# Patient Record
Sex: MALE | Race: WHITE | Employment: FULL TIME | URBAN - METROPOLITAN AREA
[De-identification: names, ages, dates, MRNs, and addresses within clinical notes are randomized per-mention and may not be internally consistent; named-entity substitution may affect disease eponyms.]

---

## 2020-05-12 ENCOUNTER — HOSPITAL ENCOUNTER (OUTPATIENT)
Dept: GENERAL RADIOLOGY | Age: 60
Discharge: HOME | End: 2020-05-12
Payer: COMMERCIAL

## 2020-05-12 ENCOUNTER — HOSPITAL ENCOUNTER (OUTPATIENT)
Dept: LAB | Age: 60
Discharge: HOME | End: 2020-05-12
Payer: COMMERCIAL

## 2020-05-12 ENCOUNTER — OFFICE VISIT (OUTPATIENT)
Dept: CARDIOLOGY CLINIC | Age: 60
End: 2020-05-12

## 2020-05-12 VITALS
BODY MASS INDEX: 30.52 KG/M2 | WEIGHT: 218 LBS | SYSTOLIC BLOOD PRESSURE: 110 MMHG | HEART RATE: 113 BPM | DIASTOLIC BLOOD PRESSURE: 72 MMHG | OXYGEN SATURATION: 97 % | HEIGHT: 71 IN

## 2020-05-12 DIAGNOSIS — R07.9 CHEST PAIN, UNSPECIFIED TYPE: ICD-10-CM

## 2020-05-12 DIAGNOSIS — R07.9 CHEST PAIN, UNSPECIFIED TYPE: Primary | ICD-10-CM

## 2020-05-12 LAB
ALBUMIN SERPL-MCNC: 4.4 G/DL (ref 3.4–5)
ALBUMIN/GLOB SERPL: 1.1 {RATIO} (ref 0.8–1.7)
ALP SERPL-CCNC: 78 U/L (ref 45–117)
ALT SERPL-CCNC: 38 U/L (ref 16–61)
ANION GAP SERPL CALC-SCNC: 9 MMOL/L (ref 3–18)
AST SERPL-CCNC: 24 U/L (ref 10–38)
BASOPHILS # BLD: 0 K/UL (ref 0–0.1)
BASOPHILS NFR BLD: 0 % (ref 0–2)
BILIRUB SERPL-MCNC: 0.7 MG/DL (ref 0.2–1)
BUN SERPL-MCNC: 15 MG/DL (ref 7–18)
BUN/CREAT SERPL: 13 (ref 12–20)
CALCIUM SERPL-MCNC: 8.8 MG/DL (ref 8.5–10.1)
CHLORIDE SERPL-SCNC: 103 MMOL/L (ref 100–111)
CO2 SERPL-SCNC: 27 MMOL/L (ref 21–32)
CREAT SERPL-MCNC: 1.19 MG/DL (ref 0.6–1.3)
DIFFERENTIAL METHOD BLD: ABNORMAL
EOSINOPHIL # BLD: 0 K/UL (ref 0–0.4)
EOSINOPHIL NFR BLD: 0 % (ref 0–5)
ERYTHROCYTE [DISTWIDTH] IN BLOOD BY AUTOMATED COUNT: 12.8 % (ref 11.6–14.5)
GLOBULIN SER CALC-MCNC: 3.9 G/DL (ref 2–4)
GLUCOSE SERPL-MCNC: 124 MG/DL (ref 74–99)
HCT VFR BLD AUTO: 46.6 % (ref 36–48)
HGB BLD-MCNC: 16.4 G/DL (ref 13–16)
INR PPP: 1 (ref 0.8–1.2)
LYMPHOCYTES # BLD: 2.6 K/UL (ref 0.9–3.6)
LYMPHOCYTES NFR BLD: 27 % (ref 21–52)
MCH RBC QN AUTO: 29.1 PG (ref 24–34)
MCHC RBC AUTO-ENTMCNC: 35.2 G/DL (ref 31–37)
MCV RBC AUTO: 82.8 FL (ref 74–97)
MONOCYTES # BLD: 0.8 K/UL (ref 0.05–1.2)
MONOCYTES NFR BLD: 9 % (ref 3–10)
NEUTS SEG # BLD: 6.1 K/UL (ref 1.8–8)
NEUTS SEG NFR BLD: 64 % (ref 40–73)
PLATELET # BLD AUTO: 259 K/UL (ref 135–420)
PMV BLD AUTO: 9.9 FL (ref 9.2–11.8)
POTASSIUM SERPL-SCNC: 4.5 MMOL/L (ref 3.5–5.5)
PROT SERPL-MCNC: 8.3 G/DL (ref 6.4–8.2)
PROTHROMBIN TIME: 13.1 SEC (ref 11.5–15.2)
RBC # BLD AUTO: 5.63 M/UL (ref 4.7–5.5)
SODIUM SERPL-SCNC: 139 MMOL/L (ref 136–145)
WBC # BLD AUTO: 9.5 K/UL (ref 4.6–13.2)

## 2020-05-12 PROCEDURE — 36415 COLL VENOUS BLD VENIPUNCTURE: CPT

## 2020-05-12 PROCEDURE — 80053 COMPREHEN METABOLIC PANEL: CPT

## 2020-05-12 PROCEDURE — 71046 X-RAY EXAM CHEST 2 VIEWS: CPT

## 2020-05-12 PROCEDURE — 85610 PROTHROMBIN TIME: CPT

## 2020-05-12 PROCEDURE — 85025 COMPLETE CBC W/AUTO DIFF WBC: CPT

## 2020-05-12 RX ORDER — DAPAGLIFLOZIN AND METFORMIN HYDROCHLORIDE 5; 1000 MG/1; MG/1
TABLET, FILM COATED, EXTENDED RELEASE ORAL
COMMUNITY

## 2020-05-12 RX ORDER — ASPIRIN 325 MG
325 TABLET ORAL DAILY
COMMUNITY
End: 2020-05-15

## 2020-05-12 RX ORDER — ROSUVASTATIN CALCIUM 20 MG/1
20 TABLET, COATED ORAL
COMMUNITY

## 2020-05-12 NOTE — PROGRESS NOTES
Chapincito Guadarrama presents today for No chief complaint on file. Chapincito Guadarrama preferred language for health care discussion is english/other. Is someone accompanying this pt? No      Is the patient using any DME equipment during OV? no    Depression Screening:  3 most recent PHQ Screens 5/12/2020   Little interest or pleasure in doing things Not at all   Feeling down, depressed, irritable, or hopeless Not at all   Total Score PHQ 2 0       Learning Assessment:  Learning Assessment 5/12/2020   PRIMARY LEARNER Patient   HIGHEST LEVEL OF EDUCATION - PRIMARY LEARNER  GRADUATED HIGH SCHOOL OR GED   BARRIERS PRIMARY LEARNER NONE   CO-LEARNER CAREGIVER No   CO-LEARNER NAME no   PRIMARY LANGUAGE ENGLISH   LEARNER PREFERENCE PRIMARY DEMONSTRATION   ANSWERED BY patient   RELATIONSHIP SELF       Abuse Screening:  Abuse Screening Questionnaire 5/12/2020   Do you ever feel afraid of your partner? N   Are you in a relationship with someone who physically or mentally threatens you? N   Is it safe for you to go home? Y       Fall Risk  Fall Risk Assessment, last 12 mths 5/12/2020   Able to walk? Yes   Fall in past 12 months? No       Pt currently taking Anticoagulant therapy?  mg once a day    Coordination of Care:  1. Have you been to the ER, urgent care clinic since your last visit? Hospitalized since your last visit? no    2. Have you seen or consulted any other health care providers outside of the [address] [place] [address] since your last visit? Include any pap smears or colon screening.  no

## 2020-05-12 NOTE — PATIENT INSTRUCTIONS
Instructions Patients Name:  Mayda Longoria 1. You are scheduled to have a Left Heart Cath on May 13, 2020  at 0915 am Please check in at 0815 am  . 2. Please go to [facility] and park in the outpatient parking lot that is located around to the back of the hospital and enter through the [facility] building. Once you enter through the [facility] check in with the  there. The  will either give you directions or assist you in getting to the cath holding area. 3. You are not to eat or drink anything after midnight the night before your procedure. Small sips of water to take your medications is ok. 4. If you are diabetic, do not take your insulin/sugar pill the morning of the procedure. 5. MEDICATION INSTRUCTIONS:   Please take your morning medications with the following special instructions: 
 
[x]          Please make sure to take your Blood pressure medication : . [x]          Take your Aspirin . [x]          Stop your dapagliflozin-metformin on  May 12, 2020 and do not resume it until after you have the blood work done on  May 15, 2020. 
 
 
 
6. We encourage families to wait in the waiting room on the first floor while the procedure is being done. The Doctor will come out and talk with you as soon as the procedure is over. 7. There is the possibility that you may spend the night in the hospital, depending on the results of the procedure. This will be determined after the procedure is done. If angioplasty or stent is planned, you will stay at least one day. 8. If you or your family have any questions, please call our office Monday Friday, 9:00 a. m.4:30 p.m.,  At [phone], and ask to speak to one of the nurses.

## 2020-05-12 NOTE — PROGRESS NOTES
HISTORY OF PRESENT ILLNESS  Evelio López is a 63 y.o. male. ASSESSMENT and PLAN    Mr. Evelio López has no previous diagnosis of CAD. He has history of non-insulin-dependent diabetes mellitus, hypertension, hyperlipidemia, family history of early CAD, tobacco use for last 40 years. He recently developed unstable angina and ACS but did not seek medical attention. He symptoms are quite suspicious for ACS and unstable angina. Would proceed directly to heart catheterization, coronary angiography, and possible revascularization as needed. He is already taking aspirin which will be changed to 81 mg daily. He is already on Crestor 20 mg daily which will be continued. His amlodipine will be held and Toprol-XL 50 mg daily will be started. I advised him to discontinue tobacco use completely. His weight today is 218 pounds. He states that back in 2018, he weighed 240 pounds. He still smokes about 4 cigarettes daily. He remains on regular aspirin daily. There is no evidence of decompensated CHF. He has not had any chest pains at rest since last Tuesday. He is scheduled to undergo coronary angiography tomorrow. All questions were answered. He agrees. Encounter Diagnoses   Name Primary?  Chest pain, unspecified type Yes     the following changes in treatment are made: See above  lab results and schedule of future lab studies reviewed with patient  reviewed diet, exercise and weight control  very strongly urged to quit smoking to reduce cardiovascular risk  cardiovascular risk and specific lipid/LDL goals reviewed  use of aspirin to prevent MI and TIA's discussed      HPI   Today, Mr. Baldo Mayes has no complaints of chest pain. However, last Tuesday, a week ago, he was awakened from sleep with severe substernal chest pain with radiation to both arms and jaw. He did not have any diaphoresis or dyspnea. This episode lasted about half an hour and then subsided.   Since then, he has had mild episodes of chest discomfort when he walks 1 mile and from his parking space to his workspace. They are easily relieved with rest.  He has not had any orthopnea or PND. He has not any palpitations, dizziness or sarah syncope. Review of Systems   Respiratory: Negative for shortness of breath. Cardiovascular: Positive for chest pain. Negative for palpitations, orthopnea, claudication, leg swelling and PND. All other systems reviewed and are negative. Physical Exam  Vitals signs and nursing note reviewed. Constitutional:       Appearance: Normal appearance. HENT:      Head: Normocephalic and atraumatic. Eyes:      Extraocular Movements: Extraocular movements intact. Conjunctiva/sclera: Conjunctivae normal.   Neck:      Musculoskeletal: No neck rigidity. Cardiovascular:      Rate and Rhythm: Regular rhythm. Tachycardia present. Pulmonary:      Breath sounds: Normal breath sounds. Abdominal:      General: Bowel sounds are normal.      Palpations: Abdomen is soft. Musculoskeletal:         General: No swelling. Skin:     General: Skin is warm and dry. Neurological:      General: No focal deficit present. Mental Status: He is alert and oriented to person, place, and time. Psychiatric:         Mood and Affect: Mood normal.         Behavior: Behavior normal.         PCP: Arnold Osei MD    Past Medical History:   Diagnosis Date    Diabetes mellitus ([facility] [address]     Elevated prostate specific antigen (PSA) 1/2013    Hypertension     UTI (lower urinary tract infection) 1/2013       Past Surgical History:   Procedure Laterality Date    HX WRIST FRACTURE [place]  2002    Dr. Pippa Bowman       Current Outpatient Medications   Medication Sig Dispense Refill    aspirin (ASPIRIN) 325 mg tablet Take 325 mg by mouth daily.  rosuvastatin (CRESTOR) 20 mg tablet Take 20 mg by mouth nightly.  dapagliflozin-metformin (Xigduo XR) 5-1,000 mg TBph Take  by mouth.       amLODIPine (NORVASC) 10 mg tablet Take  by mouth daily.  lisinopril (PRINIVIL, ZESTRIL) 10 mg tablet Take  by mouth daily. The patient has a family history of    Social History     Tobacco Use    Smoking status: Current Every Day Smoker     Packs/day: 0.80     Years: 30.00     Pack years: 24.00     Types: Cigarettes    Smokeless tobacco: Never Used   Substance Use Topics    Alcohol use:  Yes     Alcohol/week: 11.7 standard drinks     Types: 14 drink(s) per week    Drug use: No       No results found for: CHOL, CHOLX, CHLST, CHOLV, HDL, HDLP, LDL, LDLC, DLDLP, TGLX, TRIGL, TRIGP, CHHD, CHHDX     BP Readings from Last 3 Encounters:   05/12/20 110/72   07/22/13 124/80   01/16/13 106/70        Pulse Readings from Last 3 Encounters:   05/12/20 (!) 113       Wt Readings from Last 3 Encounters:   05/12/20 98.9 kg (218 lb)   07/22/13 99.8 kg (220 lb)   03/21/13 93 kg (205 lb)         EKG: nonspecific ST and T waves changes, sinus tachycardia, Q waves in V1 and V2.

## 2020-05-12 NOTE — LETTER
2020 2:14 PM 
 
 
 
Soto Saucedo 
[id number] 
1960 Insurance:  [place] [employer]                   Auth # _____________________ Proc Date:                 Proc Time:  9:15 Performing MD : Dr. Gerardo Briones                      Procedure:Left [place] [facility]:  [facility]                                            PCP Dr. Romina Eddy Scheduled with:  Maria G-email                                                        Date:2020 HP:      EK/12    Labs:______  CXR: _______  Orders:  Special Instructions:  _____________________________________________________ 
______________________________________________________________________ 
______________________________________________________________________ Date Faxed:   ______________   Pages Faxed: ___________________ The materials enclosed with this facsimile transmission are private and confidential and are the property of the sender. If you are not the intended recipient, be advised that any unauthorized use, disclosure, copying, distribution, or the taking of any action in reliance on the contents of this telecopied information is strictly prohibited. If you have received this in error, please immediately notify the sender via telephone to arrange for return of the forwarded documentation.

## 2020-05-13 ENCOUNTER — APPOINTMENT (OUTPATIENT)
Dept: NON INVASIVE DIAGNOSTICS | Age: 60
End: 2020-05-13
Attending: INTERNAL MEDICINE
Payer: COMMERCIAL

## 2020-05-13 ENCOUNTER — HOSPITAL ENCOUNTER (OUTPATIENT)
Age: 60
Discharge: HOME | End: 2020-05-15
Attending: INTERNAL MEDICINE | Admitting: INTERNAL MEDICINE
Payer: COMMERCIAL

## 2020-05-13 DIAGNOSIS — I25.118 CORONARY ARTERY DISEASE OF NATIVE HEART WITH STABLE ANGINA PECTORIS, UNSPECIFIED VESSEL OR LESION TYPE (HCC): ICD-10-CM

## 2020-05-13 DIAGNOSIS — R94.31 ABNORMAL EKG: ICD-10-CM

## 2020-05-13 DIAGNOSIS — I20.0 UNSTABLE ANGINA (HCC): ICD-10-CM

## 2020-05-13 PROBLEM — I24.9 ACS (ACUTE CORONARY SYNDROME) (HCC): Status: ACTIVE | Noted: 2020-05-13

## 2020-05-13 LAB
ECHO AO ROOT DIAM: 2.81 CM
ECHO LA AREA 4C: 12.6 CM2
ECHO LA VOL 2C: 57.78 ML (ref 18–58)
ECHO LA VOL 4C: 26.59 ML (ref 18–58)
ECHO LA VOL BP: 47.21 ML (ref 18–58)
ECHO LA VOL/BSA BIPLANE: 21.71 ML/M2 (ref 16–28)
ECHO LA VOLUME INDEX A2C: 26.57 ML/M2 (ref 16–28)
ECHO LA VOLUME INDEX A4C: 12.23 ML/M2 (ref 16–28)
ECHO LV E' LATERAL VELOCITY: 9.19 CM/S
ECHO LV E' SEPTAL VELOCITY: 7.03 CM/S
ECHO LV EDV TEICHHOLZ: 0.81 ML
ECHO LV ESV TEICHHOLZ: 0.59 ML
ECHO LV INTERNAL DIMENSION DIASTOLIC: 5.51 CM (ref 4.2–5.9)
ECHO LV INTERNAL DIMENSION SYSTOLIC: 4.8 CM
ECHO LV IVSD: 0.78 CM (ref 0.6–1)
ECHO LV MASS 2D: 201.9 G (ref 88–224)
ECHO LV MASS INDEX 2D: 92.8 G/M2 (ref 49–115)
ECHO LV POSTERIOR WALL DIASTOLIC: 0.92 CM (ref 0.6–1)
ECHO LVOT DIAM: 2.48 CM
ECHO LVOT PEAK GRADIENT: 1.9 MMHG
ECHO LVOT PEAK VELOCITY: 69.63 CM/S
ECHO LVOT VTI: 12.26 CM
GLUCOSE BLD STRIP.AUTO-MCNC: 210 MG/DL (ref 70–110)
LVFS 2D: 12.89 %
LVOT MG: 1.11 MMHG
LVOT MV: 0.49 CM/S
LVSV (TEICH): 18.2 ML

## 2020-05-13 PROCEDURE — 99153 MOD SED SAME PHYS/QHP EA: CPT | Performed by: INTERNAL MEDICINE

## 2020-05-13 PROCEDURE — 93306 TTE W/DOPPLER COMPLETE: CPT

## 2020-05-13 PROCEDURE — C1769 GUIDE WIRE: HCPCS | Performed by: INTERNAL MEDICINE

## 2020-05-13 PROCEDURE — 92929 HC PLC DE STNT +/-PTA MAJOR COR VESL/BRNCH  ADD RC: CPT

## 2020-05-13 PROCEDURE — 74011250636 HC RX REV CODE- 250/636: Performed by: INTERNAL MEDICINE

## 2020-05-13 PROCEDURE — C1725 CATH, TRANSLUMIN NON-LASER: HCPCS | Performed by: INTERNAL MEDICINE

## 2020-05-13 PROCEDURE — 77030013797 HC KT TRNSDUC PRSSR EDWD -A: Performed by: INTERNAL MEDICINE

## 2020-05-13 PROCEDURE — C1894 INTRO/SHEATH, NON-LASER: HCPCS | Performed by: INTERNAL MEDICINE

## 2020-05-13 PROCEDURE — 77030013519 HC DEV INFL BASIX MRTM -B: Performed by: INTERNAL MEDICINE

## 2020-05-13 PROCEDURE — 74011000258 HC RX REV CODE- 258: Performed by: INTERNAL MEDICINE

## 2020-05-13 PROCEDURE — 93458 L HRT ARTERY/VENTRICLE ANGIO: CPT | Performed by: INTERNAL MEDICINE

## 2020-05-13 PROCEDURE — 99152 MOD SED SAME PHYS/QHP 5/>YRS: CPT | Performed by: INTERNAL MEDICINE

## 2020-05-13 PROCEDURE — C1887 CATHETER, GUIDING: HCPCS | Performed by: INTERNAL MEDICINE

## 2020-05-13 PROCEDURE — 77030015766: Performed by: INTERNAL MEDICINE

## 2020-05-13 PROCEDURE — 77030013744: Performed by: INTERNAL MEDICINE

## 2020-05-13 PROCEDURE — C1874 STENT, COATED/COV W/DEL SYS: HCPCS | Performed by: INTERNAL MEDICINE

## 2020-05-13 PROCEDURE — 74011636320 HC RX REV CODE- 636/320: Performed by: INTERNAL MEDICINE

## 2020-05-13 PROCEDURE — 74011000250 HC RX REV CODE- 250: Performed by: INTERNAL MEDICINE

## 2020-05-13 PROCEDURE — 77030027845 HC BND COM RDL D-STAT TELE -B: Performed by: INTERNAL MEDICINE

## 2020-05-13 PROCEDURE — 77030004558 HC CATH ANGI DX SUPR TORQ CARD -A: Performed by: INTERNAL MEDICINE

## 2020-05-13 PROCEDURE — 74011250637 HC RX REV CODE- 250/637: Performed by: INTERNAL MEDICINE

## 2020-05-13 PROCEDURE — 77030012468 HC VLV BLEEDBK CNTRL ABBT -B: Performed by: INTERNAL MEDICINE

## 2020-05-13 PROCEDURE — 92928 PRQ TCAT PLMT NTRAC ST 1 LES: CPT | Performed by: INTERNAL MEDICINE

## 2020-05-13 PROCEDURE — 82962 GLUCOSE BLOOD TEST: CPT

## 2020-05-13 DEVICE — XIENCE SIERRA™ EVEROLIMUS ELUTING CORONARY STENT SYSTEM 4.00 MM X 18 MM / RAPID-EXCHANGE
Type: IMPLANTABLE DEVICE | Status: FUNCTIONAL
Brand: XIENCE SIERRA™

## 2020-05-13 DEVICE — XIENCE SIERRA™ EVEROLIMUS ELUTING CORONARY STENT SYSTEM 3.50 MM X 12 MM / RAPID-EXCHANGE
Type: IMPLANTABLE DEVICE | Status: FUNCTIONAL
Brand: XIENCE SIERRA™

## 2020-05-13 RX ORDER — METOPROLOL SUCCINATE 50 MG/1
50 TABLET, EXTENDED RELEASE ORAL DAILY
Status: DISCONTINUED | OUTPATIENT
Start: 2020-05-13 | End: 2020-05-15 | Stop reason: HOSPADM

## 2020-05-13 RX ORDER — SODIUM CHLORIDE 9 MG/ML
INJECTION, SOLUTION INTRAVENOUS
Status: DISCONTINUED | OUTPATIENT
Start: 2020-05-13 | End: 2020-05-13 | Stop reason: HOSPADM

## 2020-05-13 RX ORDER — NITROGLYCERIN 400 UG/1
1 SPRAY ORAL
Status: ACTIVE | OUTPATIENT
Start: 2020-05-13 | End: 2020-05-14

## 2020-05-13 RX ORDER — ROSUVASTATIN CALCIUM 20 MG/1
20 TABLET, COATED ORAL
Status: DISCONTINUED | OUTPATIENT
Start: 2020-05-13 | End: 2020-05-15 | Stop reason: HOSPADM

## 2020-05-13 RX ORDER — HEPARIN SODIUM 1000 [USP'U]/ML
INJECTION, SOLUTION INTRAVENOUS; SUBCUTANEOUS AS NEEDED
Status: DISCONTINUED | OUTPATIENT
Start: 2020-05-13 | End: 2020-05-13 | Stop reason: HOSPADM

## 2020-05-13 RX ORDER — BIVALIRUDIN 250 MG/5ML
INJECTION, POWDER, LYOPHILIZED, FOR SOLUTION INTRAVENOUS AS NEEDED
Status: DISCONTINUED | OUTPATIENT
Start: 2020-05-13 | End: 2020-05-13 | Stop reason: HOSPADM

## 2020-05-13 RX ORDER — FENTANYL CITRATE 50 UG/ML
INJECTION, SOLUTION INTRAMUSCULAR; INTRAVENOUS AS NEEDED
Status: DISCONTINUED | OUTPATIENT
Start: 2020-05-13 | End: 2020-05-13 | Stop reason: HOSPADM

## 2020-05-13 RX ORDER — SODIUM CHLORIDE 0.9 % (FLUSH) 0.9 %
5-40 SYRINGE (ML) INJECTION AS NEEDED
Status: DISCONTINUED | OUTPATIENT
Start: 2020-05-13 | End: 2020-05-15 | Stop reason: HOSPADM

## 2020-05-13 RX ORDER — ASPIRIN 81 MG/1
81 TABLET ORAL DAILY
Status: DISCONTINUED | OUTPATIENT
Start: 2020-05-14 | End: 2020-05-15 | Stop reason: HOSPADM

## 2020-05-13 RX ORDER — VERAPAMIL HYDROCHLORIDE 2.5 MG/ML
INJECTION, SOLUTION INTRAVENOUS AS NEEDED
Status: DISCONTINUED | OUTPATIENT
Start: 2020-05-13 | End: 2020-05-13 | Stop reason: HOSPADM

## 2020-05-13 RX ORDER — LIDOCAINE HYDROCHLORIDE 10 MG/ML
INJECTION, SOLUTION EPIDURAL; INFILTRATION; INTRACAUDAL; PERINEURAL AS NEEDED
Status: DISCONTINUED | OUTPATIENT
Start: 2020-05-13 | End: 2020-05-13 | Stop reason: HOSPADM

## 2020-05-13 RX ORDER — SODIUM CHLORIDE 450 MG/100ML
150 INJECTION, SOLUTION INTRAVENOUS CONTINUOUS
Status: DISPENSED | OUTPATIENT
Start: 2020-05-13 | End: 2020-05-13

## 2020-05-13 RX ORDER — SODIUM CHLORIDE 0.9 % (FLUSH) 0.9 %
5-40 SYRINGE (ML) INJECTION EVERY 8 HOURS
Status: DISCONTINUED | OUTPATIENT
Start: 2020-05-13 | End: 2020-05-15 | Stop reason: HOSPADM

## 2020-05-13 RX ORDER — MIDAZOLAM HYDROCHLORIDE 1 MG/ML
INJECTION, SOLUTION INTRAMUSCULAR; INTRAVENOUS AS NEEDED
Status: DISCONTINUED | OUTPATIENT
Start: 2020-05-13 | End: 2020-05-13 | Stop reason: HOSPADM

## 2020-05-13 RX ORDER — METFORMIN HYDROCHLORIDE 1000 MG/1
1000 TABLET ORAL DAILY
COMMUNITY

## 2020-05-13 RX ORDER — LISINOPRIL 10 MG/1
10 TABLET ORAL DAILY
Status: DISCONTINUED | OUTPATIENT
Start: 2020-05-14 | End: 2020-05-15 | Stop reason: HOSPADM

## 2020-05-13 RX ADMIN — ROSUVASTATIN CALCIUM 20 MG: 20 TABLET, COATED ORAL at 21:52

## 2020-05-13 RX ADMIN — Medication 10 ML: at 21:52

## 2020-05-13 RX ADMIN — SODIUM CHLORIDE 150 ML: 450 INJECTION, SOLUTION INTRAVENOUS at 14:00

## 2020-05-13 RX ADMIN — Medication 10 ML: at 14:00

## 2020-05-13 RX ADMIN — METOPROLOL SUCCINATE 50 MG: 50 TABLET, EXTENDED RELEASE ORAL at 13:56

## 2020-05-13 NOTE — Clinical Note
TRANSFER - IN REPORT:     Verbal report received from: osvaldo kent rn. Report consisted of patient's Situation, Background, Assessment and   Recommendations(SBAR). Opportunity for questions and clarification was provided. Assessment completed upon patient's arrival to unit and care assumed. Patient transported with a Cardiac Cath Tech / Patient Care Tech.

## 2020-05-13 NOTE — Clinical Note
Lesion located in the Proximal Cx. Balloon inserted. Lesion #1: Pressure = 14 rodriguez; Duration = 12 sec.

## 2020-05-13 NOTE — Clinical Note
Lesion: Located in the Proximal Cx. Stent deployed. First inflation pressure = 10 rodriguez; inflation time: 23 sec.

## 2020-05-13 NOTE — Clinical Note
Lesion located in the Mid LAD. Balloon inserted. Lesion #1: Pressure = 6 rodriguez; Duration = 8 sec.

## 2020-05-13 NOTE — H&P
Addendum:    Independently seen and evaluated. Because of new onset accelerating unstable angina, he is scheduled to undergo coronary angiography today. All questions answered. He agrees with the plan. [facility]  5/13/2020      HISTORY OF PRESENT ILLNESS  Elaine Tirado is a 63 y.o. male.     ASSESSMENT and PLAN     Mr. Elaine Tirado has no previous diagnosis of CAD. He has history of non-insulin-dependent diabetes mellitus, hypertension, hyperlipidemia, family history of early CAD, tobacco use for last 40 years. He recently developed unstable angina and ACS but did not seek medical attention. He symptoms are quite suspicious for ACS and unstable angina. Would proceed directly to heart catheterization, coronary angiography, and possible revascularization as needed. He is already taking aspirin which will be changed to 81 mg daily. He is already on Crestor 20 mg daily which will be continued. His amlodipine will be held and Toprol-XL 50 mg daily will be started. I advised him to discontinue tobacco use completely. His weight today is 218 pounds. He states that back in 2018, he weighed 240 pounds. He still smokes about 4 cigarettes daily. He remains on regular aspirin daily. There is no evidence of decompensated CHF. He has not had any chest pains at rest since last Tuesday. He is scheduled to undergo coronary angiography tomorrow. All questions were answered. He agrees.          Encounter Diagnoses   Name Primary?  Chest pain, unspecified type Yes      the following changes in treatment are made: See above  lab results and schedule of future lab studies reviewed with patient  reviewed diet, exercise and weight control  very strongly urged to quit smoking to reduce cardiovascular risk  cardiovascular risk and specific lipid/LDL goals reviewed  use of aspirin to prevent MI and TIA's discussed        HPI   Today, Mr. Jama Garcia has no complaints of chest pain.   However, last Tuesday, a week ago, he was awakened from sleep with severe substernal chest pain with radiation to both arms and jaw. He did not have any diaphoresis or dyspnea. This episode lasted about half an hour and then subsided. Since then, he has had mild episodes of chest discomfort when he walks 1 mile and from his parking space to his workspace. They are easily relieved with rest.  He has not had any orthopnea or PND. He has not any palpitations, dizziness or sarah syncope.     Review of Systems   Respiratory: Negative for shortness of breath. Cardiovascular: Positive for chest pain. Negative for palpitations, orthopnea, claudication, leg swelling and PND. All other systems reviewed and are negative.        Physical Exam  Vitals signs and nursing note reviewed. Constitutional:       Appearance: Normal appearance. HENT:      Head: Normocephalic and atraumatic. Eyes:      Extraocular Movements: Extraocular movements intact. Conjunctiva/sclera: Conjunctivae normal.   Neck:      Musculoskeletal: No neck rigidity. Cardiovascular:      Rate and Rhythm: Regular rhythm. Tachycardia present. Pulmonary:      Breath sounds: Normal breath sounds. Abdominal:      General: Bowel sounds are normal.      Palpations: Abdomen is soft. Musculoskeletal:         General: No swelling. Skin:     General: Skin is warm and dry. Neurological:      General: No focal deficit present. Mental Status: He is alert and oriented to person, place, and time.    Psychiatric:         Mood and Affect: Mood normal.         Behavior: Behavior normal.            PCP: Dennis Carrillo MD          Past Medical History:   Diagnosis Date    Diabetes mellitus ([facility] [address] [address]      Elevated prostate specific antigen (PSA) 1/2013    Hypertension      UTI (lower urinary tract infection) 1/2013               Past Surgical History:   Procedure Laterality Date    HX WRIST FRACTURE [place]   2002     Dr. Juliana Davey                Current Outpatient Medications Medication Sig Dispense Refill    aspirin (ASPIRIN) 325 mg tablet Take 325 mg by mouth daily.        rosuvastatin (CRESTOR) 20 mg tablet Take 20 mg by mouth nightly.        dapagliflozin-metformin (Xigduo XR) 5-1,000 mg TBph Take  by mouth.        amLODIPine (NORVASC) 10 mg tablet Take  by mouth daily.          lisinopril (PRINIVIL, ZESTRIL) 10 mg tablet Take  by mouth daily.               The patient has a family history of     Social History            Tobacco Use    Smoking status: Current Every Day Smoker       Packs/day: 0.80       Years: 30.00       Pack years: 24.00       Types: Cigarettes    Smokeless tobacco: Never Used   Substance Use Topics    Alcohol use: Yes       Alcohol/week: 11.7 standard drinks       Types: 14 drink(s) per week    Drug use:  No         No results found for: CHOL, CHOLX, CHLST, CHOLV, HDL, HDLP, LDL, LDLC, DLDLP, TGLX, TRIGL, TRIGP, CHHD, CHHDX          BP Readings from Last 3 Encounters:   05/12/20 110/72   07/22/13 124/80   01/16/13 106/70             Pulse Readings from Last 3 Encounters:   05/12/20 (!) 113             Wt Readings from Last 3 Encounters:   05/12/20 98.9 kg (218 lb)   07/22/13 99.8 kg (220 lb)   03/21/13 93 kg (205 lb)            EKG: nonspecific ST and T waves changes, sinus tachycardia, Q waves in V1 and V2.

## 2020-05-13 NOTE — PROGRESS NOTES
1600: TRANSFER - IN REPORT:    Verbal report received from Angi Hartley (name) on Judith Bacon  being received from Cath/Holding (unit) for routine progression of care      Report consisted of patients Situation, Background, Assessment and   Recommendations(SBAR). Information from the following report(s) SBAR, Kardex, Procedure Summary and Cardiac Rhythm NSR was reviewed with the receiving nurse. Opportunity for questions and clarification was provided. Assessment completed upon patients arrival to unit and care assumed. 1615: Patient arrived on unit    1900: Bedside shift change report given to Joe Coles RN (oncoming nurse) by Krystal Langley RN (offgoing nurse). Report included the following information SBAR, Kardex, Procedure Summary and Cardiac Rhythm NSR.

## 2020-05-13 NOTE — Clinical Note
Lesion located in the Mid LAD. Balloon balloon re-inflated. Lesion #1: Pressure = 8 rodriguez; Duration = 10 sec.

## 2020-05-13 NOTE — Clinical Note
Lesion: Located in the Mid RCA. Stent inserted. Stent deployed. Multiple inflations used. First inflation pressure = 12 rodriguez; inflation time: 29 sec. Second inflation pressure: 14 rodriguez; inflation time: 10 sec. Third inflation pressure: 20 rodriguez; inflation time: 10 sec. Fourth inflation pressure: 20 rodriguez; inflation time: 22 sec.

## 2020-05-13 NOTE — Clinical Note
Lesion: Located in the Mid RCA. Stent inserted. Single technique used. First inflation pressure = 14 rodriguez; inflation time: 16 sec.

## 2020-05-13 NOTE — Clinical Note
Lesion located in the Distal RCA. Balloon inserted. Balloon inflated using multiple inflations inflation technique. Lesion #1: Pressure = 10 rodriguez; Duration = 8 sec. Inflation 2: Pressure = 10 rodriguez; Duration = 8 sec.    Rca angiograms in between balloon inflations

## 2020-05-13 NOTE — Clinical Note
Right groin and right radial prepped with ChloraPrep and draped. Wet prep solution applied at: 1035. Wet prep solution dried at: 1038. Wet prep elapsed drying time: 3 mins.

## 2020-05-13 NOTE — Clinical Note
TRANSFER - OUT REPORT:     Verbal report given to: dea espana rn. Report consisted of patient's Situation, Background, Assessment and   Recommendations(SBAR). Opportunity for questions and clarification was provided. Patient transported with a Cardiac Cath Tech / Patient Care Tech. Patient transported to: Sana Waterman.

## 2020-05-13 NOTE — Clinical Note
Contrast Dose Calculator:   Patient's age: 63.   Patient's sex: Male. Patient weight (kg) = 97.5. Creatinine level (mg/dL) = 1.19. Creatinine clearance (mL/min): 93.   Contrast concentration (mg/mL) = 300. MACD = 300 mL. Max Contrast dose per Creatinine Cl calculator = 209.25 mL.

## 2020-05-13 NOTE — Clinical Note
TRANSFER - IN REPORT:     Verbal report received from: norma brandt rn. Report consisted of patient's Situation, Background, Assessment and   Recommendations(SBAR). Opportunity for questions and clarification was provided. Assessment completed upon patient's arrival to unit and care assumed. Patient transported with a Cardiac Cath Tech / Patient Care Tech.

## 2020-05-13 NOTE — Clinical Note
Lesion: Located in the Mid LAD. Stent deployed. First inflation pressure = 14 rodriguez; inflation time: 24 sec.

## 2020-05-13 NOTE — Clinical Note
Lesion located in the Mid LAD. Balloon inserted. Balloon inflated using multiple inflations inflation technique. Lesion #1: Pressure = 18 rodriguez; Duration = 10 sec. Inflation 2: Pressure = 20 rodriguez; Duration = 10 sec. Inflation 3: Pressure = 20 rodriguez; Duration = 6 sec. Inflation 4: Pressure = 22 rodriguez; Duration = 10 sec.

## 2020-05-13 NOTE — Clinical Note
Lesion located in the Mid RCA. Balloon balloon re-inflated. Lesion #1: Pressure = 18 rodriguez; Duration = 12 sec.

## 2020-05-13 NOTE — PROGRESS NOTES
Problem: Falls - Risk of  Goal: *Absence of Falls  Description: Document Andressa Wise Fall Risk and appropriate interventions in the flowsheet. Outcome: Progressing Towards Goal  Note: Fall Risk Interventions:            Medication Interventions: Patient to call before getting OOB, Teach patient to arise slowly                   Problem: Patient Education: Go to Patient Education Activity  Goal: Patient/Family Education  Outcome: Progressing Towards Goal     Problem:  Moderate Sedation (Adult)  Goal: *Patent airway  Outcome: Progressing Towards Goal  Goal: *Adequate oxygenation  Outcome: Progressing Towards Goal  Goal: *Absence of aspiration  Outcome: Progressing Towards Goal  Goal: *Hemodynamically stable  Outcome: Progressing Towards Goal  Goal: *Optimal pain control at patient's stated goal  Outcome: Progressing Towards Goal  Goal: *Absence of nausea/vomiting  Outcome: Progressing Towards Goal  Goal: *Anxiety reduced or absent  Outcome: Progressing Towards Goal  Goal: *Absence of injury  Outcome: Progressing Towards Goal  Goal: *Level of consciousness returns to baseline  Outcome: Progressing Towards Goal  Goal: Interventions  Outcome: Progressing Towards Goal     Problem: Patient Education: Go to Patient Education Activity  Goal: Patient/Family Education  Outcome: Progressing Towards Goal

## 2020-05-13 NOTE — Clinical Note
Right groin and right radial prepped with ChloraPrep and draped. Wet prep solution applied at: 1301. Wet prep solution dried at: 1304. Wet prep elapsed drying time: 3 mins.

## 2020-05-13 NOTE — Clinical Note
TRANSFER - OUT REPORT:     Verbal report given to: mayuri alex rn. Report consisted of patient's Situation, Background, Assessment and   Recommendations(SBAR). Opportunity for questions and clarification was provided. Patient transported with a Cardiac Cath Tech / Patient Care Tech. Patient transported to: Belle Spangler.

## 2020-05-13 NOTE — DISCHARGE SUMMARY
[facility] - Discharge Summary      Discharge Summary     Patient: Elaine Tirado MRN: [id number]  SSN: [id number]    YOB: 1960  Age: 63 y.o. Sex: male       Admit Date: 5/13/2020    Discharge Date: 5/15/2020      Admission Diagnoses: Abnormal EKG [R94.31]  Unstable angina (HCC) [I20.0]  ACS (acute coronary syndrome) ([facility] [address] [I24.9]    Discharge Diagnoses:   Problem List as of 5/15/2020 Date Reviewed: 5/12/2020          Codes Class Noted - Resolved    ACS (acute coronary syndrome) ([facility] [address] ICD-10-CM: I24.9  ICD-9-CM: 411.1  5/13/2020 - Present        Elevated prostate specific antigen (PSA) ICD-10-CM: R97.20  ICD-9-CM: 790.93  1/16/2013 - Present        UTI (lower urinary tract infection) ICD-10-CM: N39.0  ICD-9-CM: 599.0  1/16/2013 - Present               Discharge Condition: Stable    Hospital Course: Elaine Tirado is a 63 y.o. male who presented to the hospital for cardiac catheterization due to new onset unstable angina. He underwent procedure as planned on 5/13/2020 with PCI/BANG placement to mid-distal RCA with 0% residual.  He was also found to have stenoses to LAD and LCx with planned for staged intervention. It was decided to pursue repeat cardiac cath yesterday for his staged intervention, and he underwent PCI to mid LAD 75% stenosis --> 0% residual using 2.5 mm BANG, along with PCI to proximal circumflex 90% lesion --> 0% using 2.5 mm BANG. Pt has been switched from 325 mg ASA --> 81 mg ASA, started on BB and Brilinta, continued on high-intensity statin. Pt will be on ASA lifelong and Brilinta for at least a year. Smoking cessation was discussed, he reports plan to discontinue tobacco use going forward. Consults: None    Significant Diagnostic Studies: angiography:   · Three-vessel CAD. · Super dominant RCA with mid vessel long 75% stenosis and distal focal unstable appearing eccentric, hazy 95% stenosis - likely culprit.   · Mid and distal super dominant RCA lesions were stented to 0% using 4 x 18 mm + 3.50 x 12 mm BANG. · LAD is quite small with distal/mid 75% stenosis. · Small circumflex with only OM branch but with proximal 85-90% stenosis. · Low normal LV function without obvious RWMA. EF 55%. Cardiac cath 5/14/2020:  · Staged LAD and circumflex stents. · Mid LAD long 75% stenosis stented to residual 0% using 2.5 mm BANG. · Proximal circumflex 90% lesion stented to 0% using 2.5 mm BANG. Disposition: home    Discharge Medications:      My Medications      START taking these medications      Instructions Each Dose to Equal Morning Noon Evening Bedtime   aspirin delayed-release 81 mg tablet  Replaces:  aspirin 325 mg tablet    Your last dose was: Your next dose is: Take 1 Tab by mouth daily. 81 mg                 metoprolol succinate 50 mg XL tablet  Commonly known as:  TOPROL-XL    Your last dose was: Your next dose is: Take 1 Tab by mouth daily. 50 mg                 ticagrelor 90 mg tablet  Commonly known as:  BRILINTA    Your last dose was: Your next dose is: Take 1 Tab by mouth two (2) times a day. 90 mg                    CONTINUE taking these medications      Instructions Each Dose to Equal Morning Noon Evening Bedtime   lisinopriL 10 mg tablet  Commonly known as:  Pomona Kappa    Your last dose was: Your next dose is: Take  by mouth daily. rosuvastatin 20 mg tablet  Commonly known as:  CRESTOR    Your last dose was: Your next dose is: Take 20 mg by mouth nightly.    20 mg                    STOP taking these medications    aspirin 325 mg tablet  Commonly known as:  ASPIRIN  Replaced by:  aspirin delayed-release 81 mg tablet        Norvasc 10 mg tablet  Generic drug:  amLODIPine           ASK your doctor about these medications      Instructions Each Dose to Equal Morning Noon Evening Bedtime   metFORMIN 1,000 mg tablet  Commonly known as: GLUCOPHAGE    Your last dose was: Your next dose is: Take 1,000 mg by mouth daily. 1,000 mg                 Xigduo XR 5-1,000 mg Tbph  Generic drug:  dapagliflozin-metformin    Your last dose was: Your next dose is: Take  by mouth. Where to Get Your Medications      These medications were sent to [address], [address] [facility]  [address] [facility], [address] [place] [address]    Phone:  [phone]   · ticagrelor 90 mg tablet     These medications were sent to [facility], [address] [facility]  [address], [address] [facility] [address]    Phone:  [phone]   · aspirin delayed-release 81 mg tablet  · metoprolol succinate 50 mg XL tablet         Activity: As directed  Diet: Cardiac Diet  Wound Care: As directed    Follow-up Appointments   Procedures    FOLLOW UP VISIT Appointment in: Other ([facility]) Follow-up with Dr. Gina Muniz within next 3-4 weeks. Follow-up with Dr. Gina Muniz within next 3-4 weeks. Standing Status:   Standing     Number of Occurrences:   1     Order Specific Question:   Appointment in     Answer:    Other (Specify)       Signed By: Tomeka Flores PA-C     May 15, 2020

## 2020-05-13 NOTE — Clinical Note
Lesion located in the Mid RCA. Balloon inserted. Balloon inflated using single inflation technique. Lesion #1: Pressure = 16 rodriguez; Duration = 14 sec.

## 2020-05-13 NOTE — Clinical Note
Lesion located in the Proximal Cx. Balloon balloon re-inflated. Balloon inflated using single inflation technique. Lesion #1: Pressure = 10 rodriguez; Duration = 12 sec.

## 2020-05-13 NOTE — Clinical Note
Lesion: Located in the Distal RCA. Single technique used. First inflation pressure = 14 rodriguez; inflation time: 20 sec.

## 2020-05-14 LAB
ATRIAL RATE: 67 BPM
CALCULATED P AXIS, ECG09: 58 DEGREES
CALCULATED R AXIS, ECG10: 12 DEGREES
CALCULATED T AXIS, ECG11: 33 DEGREES
DIAGNOSIS, 93000: NORMAL
P-R INTERVAL, ECG05: 202 MS
Q-T INTERVAL, ECG07: 410 MS
QRS DURATION, ECG06: 118 MS
QTC CALCULATION (BEZET), ECG08: 433 MS
VENTRICULAR RATE, ECG03: 67 BPM

## 2020-05-14 PROCEDURE — 77030012468 HC VLV BLEEDBK CNTRL ABBT -B: Performed by: INTERNAL MEDICINE

## 2020-05-14 PROCEDURE — 93454 CORONARY ARTERY ANGIO S&I: CPT | Performed by: INTERNAL MEDICINE

## 2020-05-14 PROCEDURE — 77030027845 HC BND COM RDL D-STAT TELE -B: Performed by: INTERNAL MEDICINE

## 2020-05-14 PROCEDURE — 92929 HC PLC DE STNT +/-PTA MAJOR COR VESL/BRNCH  ADD LD: CPT | Performed by: INTERNAL MEDICINE

## 2020-05-14 PROCEDURE — 74011636320 HC RX REV CODE- 636/320: Performed by: INTERNAL MEDICINE

## 2020-05-14 PROCEDURE — 77030013519 HC DEV INFL BASIX MRTM -B: Performed by: INTERNAL MEDICINE

## 2020-05-14 PROCEDURE — 74011000250 HC RX REV CODE- 250: Performed by: INTERNAL MEDICINE

## 2020-05-14 PROCEDURE — 93005 ELECTROCARDIOGRAM TRACING: CPT

## 2020-05-14 PROCEDURE — C1725 CATH, TRANSLUMIN NON-LASER: HCPCS | Performed by: INTERNAL MEDICINE

## 2020-05-14 PROCEDURE — 92928 PRQ TCAT PLMT NTRAC ST 1 LES: CPT | Performed by: INTERNAL MEDICINE

## 2020-05-14 PROCEDURE — 74011000258 HC RX REV CODE- 258: Performed by: INTERNAL MEDICINE

## 2020-05-14 PROCEDURE — C1874 STENT, COATED/COV W/DEL SYS: HCPCS | Performed by: INTERNAL MEDICINE

## 2020-05-14 PROCEDURE — C1894 INTRO/SHEATH, NON-LASER: HCPCS | Performed by: INTERNAL MEDICINE

## 2020-05-14 PROCEDURE — 74011250636 HC RX REV CODE- 250/636: Performed by: INTERNAL MEDICINE

## 2020-05-14 PROCEDURE — C1769 GUIDE WIRE: HCPCS | Performed by: INTERNAL MEDICINE

## 2020-05-14 PROCEDURE — 99152 MOD SED SAME PHYS/QHP 5/>YRS: CPT | Performed by: INTERNAL MEDICINE

## 2020-05-14 PROCEDURE — 99153 MOD SED SAME PHYS/QHP EA: CPT | Performed by: INTERNAL MEDICINE

## 2020-05-14 PROCEDURE — C1887 CATHETER, GUIDING: HCPCS | Performed by: INTERNAL MEDICINE

## 2020-05-14 PROCEDURE — 74011250637 HC RX REV CODE- 250/637: Performed by: INTERNAL MEDICINE

## 2020-05-14 DEVICE — XIENCE SIERRA™ EVEROLIMUS ELUTING CORONARY STENT SYSTEM 2.50 MM X 15 MM / RAPID-EXCHANGE
Type: IMPLANTABLE DEVICE | Status: FUNCTIONAL
Brand: XIENCE SIERRA™

## 2020-05-14 DEVICE — XIENCE SIERRA™ EVEROLIMUS ELUTING CORONARY STENT SYSTEM 2.50 MM X 23 MM / RAPID-EXCHANGE
Type: IMPLANTABLE DEVICE | Status: FUNCTIONAL
Brand: XIENCE SIERRA™

## 2020-05-14 RX ORDER — METOPROLOL SUCCINATE 50 MG/1
50 TABLET, EXTENDED RELEASE ORAL DAILY
Qty: 30 TAB | Refills: 2 | Status: SHIPPED | OUTPATIENT
Start: 2020-05-15 | End: 2020-08-11 | Stop reason: SDUPTHER

## 2020-05-14 RX ORDER — SODIUM CHLORIDE 450 MG/100ML
150 INJECTION, SOLUTION INTRAVENOUS CONTINUOUS
Status: DISPENSED | OUTPATIENT
Start: 2020-05-14 | End: 2020-05-14

## 2020-05-14 RX ORDER — SODIUM CHLORIDE 0.9 % (FLUSH) 0.9 %
5-40 SYRINGE (ML) INJECTION EVERY 8 HOURS
Status: DISCONTINUED | OUTPATIENT
Start: 2020-05-14 | End: 2020-05-15 | Stop reason: HOSPADM

## 2020-05-14 RX ORDER — BIVALIRUDIN 250 MG/5ML
INJECTION, POWDER, LYOPHILIZED, FOR SOLUTION INTRAVENOUS AS NEEDED
Status: DISCONTINUED | OUTPATIENT
Start: 2020-05-14 | End: 2020-05-14 | Stop reason: HOSPADM

## 2020-05-14 RX ORDER — FENTANYL CITRATE 50 UG/ML
INJECTION, SOLUTION INTRAMUSCULAR; INTRAVENOUS AS NEEDED
Status: DISCONTINUED | OUTPATIENT
Start: 2020-05-14 | End: 2020-05-14 | Stop reason: HOSPADM

## 2020-05-14 RX ORDER — HEPARIN SODIUM 1000 [USP'U]/ML
INJECTION, SOLUTION INTRAVENOUS; SUBCUTANEOUS AS NEEDED
Status: DISCONTINUED | OUTPATIENT
Start: 2020-05-14 | End: 2020-05-14 | Stop reason: HOSPADM

## 2020-05-14 RX ORDER — MIDAZOLAM HYDROCHLORIDE 1 MG/ML
INJECTION, SOLUTION INTRAMUSCULAR; INTRAVENOUS AS NEEDED
Status: DISCONTINUED | OUTPATIENT
Start: 2020-05-14 | End: 2020-05-14 | Stop reason: HOSPADM

## 2020-05-14 RX ORDER — LIDOCAINE HYDROCHLORIDE 10 MG/ML
INJECTION, SOLUTION EPIDURAL; INFILTRATION; INTRACAUDAL; PERINEURAL AS NEEDED
Status: DISCONTINUED | OUTPATIENT
Start: 2020-05-14 | End: 2020-05-14 | Stop reason: HOSPADM

## 2020-05-14 RX ORDER — SODIUM CHLORIDE 0.9 % (FLUSH) 0.9 %
5-40 SYRINGE (ML) INJECTION AS NEEDED
Status: DISCONTINUED | OUTPATIENT
Start: 2020-05-14 | End: 2020-05-15 | Stop reason: HOSPADM

## 2020-05-14 RX ORDER — VERAPAMIL HYDROCHLORIDE 2.5 MG/ML
INJECTION, SOLUTION INTRAVENOUS AS NEEDED
Status: DISCONTINUED | OUTPATIENT
Start: 2020-05-14 | End: 2020-05-14 | Stop reason: HOSPADM

## 2020-05-14 RX ORDER — NITROGLYCERIN 400 UG/1
1 SPRAY ORAL
Status: DISCONTINUED | OUTPATIENT
Start: 2020-05-14 | End: 2020-05-15 | Stop reason: HOSPADM

## 2020-05-14 RX ORDER — ASPIRIN 81 MG/1
81 TABLET ORAL DAILY
Qty: 30 TAB | Refills: 5 | Status: SHIPPED | OUTPATIENT
Start: 2020-05-15 | End: 2020-11-12

## 2020-05-14 RX ADMIN — METOPROLOL SUCCINATE 50 MG: 50 TABLET, EXTENDED RELEASE ORAL at 08:08

## 2020-05-14 RX ADMIN — Medication 10 ML: at 15:50

## 2020-05-14 RX ADMIN — ROSUVASTATIN CALCIUM 20 MG: 20 TABLET, COATED ORAL at 21:22

## 2020-05-14 RX ADMIN — TICAGRELOR 90 MG: 90 TABLET ORAL at 00:21

## 2020-05-14 RX ADMIN — ASPIRIN 81 MG: 81 TABLET, COATED ORAL at 08:08

## 2020-05-14 RX ADMIN — Medication 10 ML: at 21:22

## 2020-05-14 RX ADMIN — LISINOPRIL 10 MG: 10 TABLET ORAL at 08:08

## 2020-05-14 NOTE — PROGRESS NOTES
1930-Received report on pt.  2000-Pt assessment done. VS stable. Pt denies pain at this time. 2152-Scheduled med given. 2230-Pt complaining of being hungry. Pt given healthy choice meal.  0021-Pt asleep in bed but easily aroused. VS stable. Pt denies needs at this time. 0200-Pt asleep in bed. No distress noted. 0415-Pt sleeping in bed but easily aroused. VS stable. Pt denies pain at this time. 0550-EKG and CHG bath done. Bedside shift change report given to 23 Jemma Garay RN (oncoming nurse) by Walter Mcgregor RN (offgoing nurse). Report included the following information SBAR, Kardex, Intake/Output, MAR and Cardiac Rhythm NSR.

## 2020-05-14 NOTE — PROGRESS NOTES
D Stat removed from pts right wrist by Eboni Zaragoza RN . Pressure dressing applied, no bleeding or hematoma formation noted.

## 2020-05-14 NOTE — PROGRESS NOTES
Reason for Admission:  Abnormal EKG [R94.31]  Unstable angina (HCC) [I20.0]  ACS (acute coronary syndrome) ([facility] [address] [I24.9]                 RRAT Score:    N/A            Plan for utilizing home health:    Not at this time                      Likelihood of Readmission:   LOW                         Transition of Care Plan:     Return home         Initial assessment completed with patient. Cognitive status of patient: oriented to time, place, person and situation. Face sheet information confirmed:  yes. The patient designates friend, Mikel Temple [phone],  to participate in his discharge plan and to receive any needed information. This patient lives in a single family home alone. Patient is able to navigate steps as needed. Prior to hospitalization, patient was considered to be independent with ADLs/IADLS : yes . Patient has a current ACP document on file: no  The friend will be available to transport patient home upon discharge. The patient has no medical equipment available in the home. Patient is not currently active with home health. Patient has not stayed in a skilled nursing facility or rehab. This patient is on dialysis :no    Currently, the discharge plan is Home. The patient states that he can obtain his medications from the pharmacy, and take his medications as directed. Patient's current insurance is [employer]. Care Management Interventions  PCP Verified by CM:  Yes  Mode of Transport at Discharge: Self  Transition of Care Consult (CM Consult): Discharge Planning  Current Support Network: Lives Alone  The Patient and/or Patient Representative was Provided with a Choice of Provider and Agrees with the Discharge Plan?: No  Freedom of Choice List was Provided with Basic Dialogue that Supports the Patient's Individualized Plan of Care/Goals, Treatment Preferences and Shares the Quality Data Associated with the Providers?: No  Discharge Location  Discharge Placement: [facility] Grover Christopher Babcock RN - Outcomes Manager  [phone]

## 2020-05-14 NOTE — PROGRESS NOTES
Problem: Falls - Risk of  Goal: *Absence of Falls  Description: Document Maureen Nilesh Fall Risk and appropriate interventions in the flowsheet. Outcome: Progressing Towards Goal  Note: Fall Risk Interventions:            Medication Interventions: Patient to call before getting OOB, Teach patient to arise slowly                   Problem: Patient Education: Go to Patient Education Activity  Goal: Patient/Family Education  Outcome: Progressing Towards Goal     Problem:  Moderate Sedation (Adult)  Goal: *Patent airway  Outcome: Progressing Towards Goal  Goal: *Adequate oxygenation  Outcome: Progressing Towards Goal  Goal: *Absence of aspiration  Outcome: Progressing Towards Goal  Goal: *Hemodynamically stable  Outcome: Progressing Towards Goal  Goal: *Optimal pain control at patient's stated goal  Outcome: Progressing Towards Goal  Goal: *Absence of nausea/vomiting  Outcome: Progressing Towards Goal  Goal: *Anxiety reduced or absent  Outcome: Progressing Towards Goal  Goal: *Absence of injury  Outcome: Progressing Towards Goal  Goal: *Level of consciousness returns to baseline  Outcome: Progressing Towards Goal  Goal: Interventions  Outcome: Progressing Towards Goal     Problem: Patient Education: Go to Patient Education Activity  Goal: Patient/Family Education  Outcome: Progressing Towards Goal

## 2020-05-14 NOTE — PROGRESS NOTES
Pt transported from CVT stepdown 2303 to cardiac cath holding bed 2, upon arrival pt awake alert and oriented x 4. Pt placed on cardiac monitor showing NSR with rate of 74. Pt has 2 working IV's that flush easily. Pt understands he is here for a repeat cardiac cath with Dr Milan Campos and consents to procedure.

## 2020-05-14 NOTE — PROGRESS NOTES
conducted an initial consultation and Spiritual Assessment for Shalom Corado, who is a 63 y. o.,male. Patient's Primary Language is: [place]. According to the patient's EMR Sikh Affiliation is: Unknown. The reason the Patient came to the hospital is:   Patient Active Problem List    Diagnosis Date Noted    ACS (acute coronary syndrome) ([facility] [address] 05/13/2020    Elevated prostate specific antigen (PSA) 01/16/2013    UTI (lower urinary tract infection) 01/16/2013        The  provided the following Interventions:  Initiated a relationship of care and support. Explored issues of dolores, belief, spirituality and Anglican/ritual needs while hospitalized. Listened empathically. Provided chaplaincy education. Provided information about Spiritual Care Services. Offered prayer and assurance of continued prayers on patient's behalf. Chart reviewed. The following outcomes where achieved:  Patient shared limited information about both their medical narrative and spiritual journey/beliefs.  confirmed Patient's Sikh Affiliation. Patient processed feeling about current hospitalization. Patient expressed gratitude for 's visit. Assessment:  Patient does not have any Anglican/cultural needs that will affect patient's preferences in health care. There are no spiritual or Anglican issues which require intervention at this time. Plan:  Chaplains will continue to follow and will provide pastoral care on an as needed/requested basis.  recommends bedside caregivers page  on duty if patient shows signs of acute spiritual or emotional distress.     [id number] [employer] Reminder   [phone]

## 2020-05-14 NOTE — PROGRESS NOTES
[facility] - Progress Note  Admit Date: 5/13/2020    Assessment:     Hospital Problems  Date Reviewed: 5/12/2020          Codes Class Noted POA    ACS (acute coronary syndrome) ([facility] [address] ICD-10-CM: I24.9  ICD-9-CM: 411.1  5/13/2020 Unknown              -Unstable angina. Presented for elective outpatient cardiac catheterization. -CAD s/p cath 5/13/2020:  · Three-vessel CAD. · Super dominant RCA with mid vessel long 75% stenosis and distal focal unstable appearing eccentric, hazy 95% stenosis-likely culprit. · LAD is quite small with distal/mid 75% stenosis. · Small circumflex with only OM branch but with proximal 85-90% stenosis. · Low normal LV function without obvious regional wall motion abnormality. EF 55%. · The mid and distal super dominant RCA lesions were stented to 0% using BANG. Will stage LAD and circumflex revascularization.    -Hypertension.  -Diabetes mellitus.  -Dyslipidemia.  -Tobacco abuse.  -Family history of CAD. Primary cardiologist Dr. Gerardo Briones. Plan: Will plan staged PCI today. Patient is continued on ASA, brilinta, statin, BB, ace. Subjective:     No CP. No SOB.     Objective:      Patient Vitals for the past 8 hrs:   Temp Pulse Resp BP SpO2   05/14/20 0723 97 °F (36.1 °C) 74 17 122/77 99 %   05/14/20 0415 97.7 °F (36.5 °C) 71 16 120/77 97 %         Patient Vitals for the past 96 hrs:   Weight   05/13/20 1256 97.5 kg (215 lb)   05/13/20 0752 97.5 kg (215 lb)                    Intake/Output Summary (Last 24 hours) at 5/14/2020 0941  Last data filed at 5/14/2020 7283  Gross per 24 hour   Intake 840 ml   Output 2100 ml   Net -1260 ml       Physical Exam:  General:  alert, cooperative, no distress, appears stated age  Neck:  no JVD  Lungs:  clear to auscultation bilaterally  Heart:  regular rate and rhythm  Abdomen:  abdomen is soft without significant tenderness, masses, organomegaly or guarding  Extremities:  extremities normal, atraumatic, no cyanosis or edema    Data Review:     Labs: Results:       Chemistry Recent Labs     05/12/20  1436   *      K 4.5      CO2 27   BUN 15   CREA 1.19   CA 8.8   AGAP 9   BUCR 13   AP 78   TP 8.3*   ALB 4.4   GLOB 3.9   AGRAT 1.1      CBC w/Diff Recent Labs     05/12/20  1436   WBC 9.5   RBC 5.63*   HGB 16.4*   HCT 46.6      GRANS 64   LYMPH 27   EOS 0      Cardiac Enzymes No results found for: CPK, CK, CKMMB, CKMB, RCK3, CKMBT, CKNDX, CKND1, KT, TROPT, TROIQ, UTLIO, TROPT, TNIPOC, BNP, BNPP   Coagulation Recent Labs     05/12/20  1436   PTP 13.1   INR 1.0       Lipid Panel No results found for: CHOL, CHOLPOCT, CHOLX, CHLST, CHOLV, 966856, HDL, HDLP, LDL, LDLC, DLDLP, 648488, VLDLC, VLDL, TGLX, TRIGL, TRIGP, TGLPOCT, CHHD, CHHDX   BNP No results found for: BNP, BNPP, XBNPT   Liver Enzymes Recent Labs     05/12/20  1436   TP 8.3*   ALB 4.4   AP 78   SGOT 24      Digoxin    Thyroid Studies No results found for: T4, T3U, TSH, TSHEXT       Signed By: MATHIEU Lawrence     May 14, 2020

## 2020-05-14 NOTE — PROGRESS NOTES
0700:   Bedside shift change report given to Kayli Garay RN (oncoming nurse) by Donato Gaary RN (offgoing nurse). Report included the following information SBAR, Kardex and Cardiac Rhythm NSR.     1100: Patient off unit to cath lab    1509:   TRANSFER - IN REPORT:    Verbal report received from Alayna Abbott [facility] (name) on Dakota Quezada  being received from [facility](unit) for routine post - op      Report consisted of patients Situation, Background, Assessment and   Recommendations(SBAR). Information from the following report(s) Procedure Summary and Cardiac Rhythm NSR, and recent vitals, was reviewed with the receiving nurse. Opportunity for questions and clarification was provided. Assessment completed upon patients arrival to unit and care assumed. 1530:  Patient arrived back on unit    1900: Bedside shift change report given to Earlene Perez RN (oncoming nurse) by Kayli Garay RN (offgoing nurse). Report included the following information SBAR, Kardex, Procedure Summary and Cardiac Rhythm NSR.

## 2020-05-15 VITALS
RESPIRATION RATE: 18 BRPM | SYSTOLIC BLOOD PRESSURE: 130 MMHG | HEIGHT: 71 IN | TEMPERATURE: 98 F | BODY MASS INDEX: 30.13 KG/M2 | DIASTOLIC BLOOD PRESSURE: 80 MMHG | WEIGHT: 215.2 LBS | OXYGEN SATURATION: 98 % | HEART RATE: 77 BPM

## 2020-05-15 LAB
ANION GAP SERPL CALC-SCNC: 5 MMOL/L (ref 3–18)
BUN SERPL-MCNC: 11 MG/DL (ref 7–18)
BUN/CREAT SERPL: 11 (ref 12–20)
CALCIUM SERPL-MCNC: 8.6 MG/DL (ref 8.5–10.1)
CHLORIDE SERPL-SCNC: 101 MMOL/L (ref 100–111)
CHOLEST SERPL-MCNC: 90 MG/DL
CO2 SERPL-SCNC: 29 MMOL/L (ref 21–32)
CREAT SERPL-MCNC: 1.01 MG/DL (ref 0.6–1.3)
ERYTHROCYTE [DISTWIDTH] IN BLOOD BY AUTOMATED COUNT: 12.9 % (ref 11.6–14.5)
EST. AVERAGE GLUCOSE BLD GHB EST-MCNC: 154 MG/DL
GLUCOSE SERPL-MCNC: 150 MG/DL (ref 74–99)
HBA1C MFR BLD: 7 % (ref 4.2–5.6)
HCT VFR BLD AUTO: 46 % (ref 36–48)
HDLC SERPL-MCNC: 31 MG/DL (ref 40–60)
HDLC SERPL: 2.9 {RATIO} (ref 0–5)
HGB BLD-MCNC: 15.7 G/DL (ref 13–16)
LDLC SERPL CALC-MCNC: 35.8 MG/DL (ref 0–100)
LIPID PROFILE,FLP: ABNORMAL
MCH RBC QN AUTO: 28.7 PG (ref 24–34)
MCHC RBC AUTO-ENTMCNC: 34.1 G/DL (ref 31–37)
MCV RBC AUTO: 84.1 FL (ref 74–97)
PLATELET # BLD AUTO: 215 K/UL (ref 135–420)
PMV BLD AUTO: 10 FL (ref 9.2–11.8)
POTASSIUM SERPL-SCNC: 4.2 MMOL/L (ref 3.5–5.5)
RBC # BLD AUTO: 5.47 M/UL (ref 4.7–5.5)
SODIUM SERPL-SCNC: 135 MMOL/L (ref 136–145)
TRIGL SERPL-MCNC: 116 MG/DL (ref ?–150)
VLDLC SERPL CALC-MCNC: 23.2 MG/DL
WBC # BLD AUTO: 10.2 K/UL (ref 4.6–13.2)

## 2020-05-15 PROCEDURE — 85027 COMPLETE CBC AUTOMATED: CPT

## 2020-05-15 PROCEDURE — 80061 LIPID PANEL: CPT

## 2020-05-15 PROCEDURE — 83036 HEMOGLOBIN GLYCOSYLATED A1C: CPT

## 2020-05-15 PROCEDURE — 36415 COLL VENOUS BLD VENIPUNCTURE: CPT

## 2020-05-15 PROCEDURE — 93005 ELECTROCARDIOGRAM TRACING: CPT

## 2020-05-15 PROCEDURE — 74011250637 HC RX REV CODE- 250/637: Performed by: INTERNAL MEDICINE

## 2020-05-15 PROCEDURE — 80048 BASIC METABOLIC PNL TOTAL CA: CPT

## 2020-05-15 RX ADMIN — TICAGRELOR 90 MG: 90 TABLET ORAL at 00:27

## 2020-05-15 RX ADMIN — LISINOPRIL 10 MG: 10 TABLET ORAL at 09:12

## 2020-05-15 RX ADMIN — Medication 10 ML: at 06:00

## 2020-05-15 RX ADMIN — ASPIRIN 81 MG: 81 TABLET, COATED ORAL at 09:12

## 2020-05-15 RX ADMIN — METOPROLOL SUCCINATE 50 MG: 50 TABLET, EXTENDED RELEASE ORAL at 09:12

## 2020-05-15 NOTE — ROUTINE PROCESS
Bedside shift change report given to Zak Day RN (oncoming nurse) by Bobby Holley RN (offgoing nurse). Report included the following information SBAR, Kardex, Procedure Summary, Intake/Output, MAR, Recent Results and Cardiac Rhythm NSR.

## 2020-05-15 NOTE — ROUTINE PROCESS
Bedside and Verbal shift change report given to [address] [facility] (oncoming nurse) by Byron Alford (offgoing nurse). Report included the following information SBAR, Kardex, Intake/Output, MAR, Recent Results and Cardiac Rhythm NSR. 
 
0900 Pt resting in bed with no complaints of pain and no change to condition. Will continue to monitor. 1100 Discharge instructions reviewed. Questions answered and clarifications provided. Pt discharged home.

## 2020-05-15 NOTE — PROGRESS NOTES
Discharge noted for today for patient to return home, no discharge needs identified.        Jean Andrew, MSW  Case Management  [phone]

## 2020-05-16 LAB
ATRIAL RATE: 62 BPM
CALCULATED P AXIS, ECG09: 11 DEGREES
CALCULATED R AXIS, ECG10: 12 DEGREES
CALCULATED T AXIS, ECG11: -7 DEGREES
DIAGNOSIS, 93000: NORMAL
P-R INTERVAL, ECG05: 190 MS
Q-T INTERVAL, ECG07: 406 MS
QRS DURATION, ECG06: 114 MS
QTC CALCULATION (BEZET), ECG08: 412 MS
VENTRICULAR RATE, ECG03: 62 BPM

## 2020-06-02 ENCOUNTER — TELEPHONE (OUTPATIENT)
Dept: CARDIAC REHAB | Age: 60
End: 2020-06-02

## 2020-06-02 NOTE — TELEPHONE ENCOUNTER
Cardiac Rehab called patient and spoke to him about the program. He declined services and stated that he is not interested because he has already quit smoking, changed his diet, and is starting to exercise. He is doing very well.     Thank you,  Javid Wild

## 2020-06-18 ENCOUNTER — OFFICE VISIT (OUTPATIENT)
Dept: CARDIOLOGY CLINIC | Age: 60
End: 2020-06-18

## 2020-06-18 VITALS
OXYGEN SATURATION: 98 % | DIASTOLIC BLOOD PRESSURE: 74 MMHG | SYSTOLIC BLOOD PRESSURE: 132 MMHG | WEIGHT: 219 LBS | BODY MASS INDEX: 30.66 KG/M2 | HEIGHT: 71 IN | HEART RATE: 80 BPM

## 2020-06-18 DIAGNOSIS — R07.9 CHEST PAIN, UNSPECIFIED TYPE: Primary | ICD-10-CM

## 2020-06-18 NOTE — LETTER
NOTIFICATION OF RETURN TO WORK 6/18/2020 1:26 PM 
 
Mr. Opal Ochoa [facility] [id number] [place] To Whom It May Concern: 
 
Opal Ochoa was under the care of [address] [place] [address]. He will be able to return to work on 6/22/2020 without restrictions. If there are questions or concerns please have the patient contact our office.  
 
Sincerely, 
 
 
 
Joseph Freitas MD

## 2020-06-18 NOTE — PROGRESS NOTES
HISTORY OF PRESENT ILLNESS  Elaine Tirado is a 63 y.o. male. ASSESSMENT and PLAN    Mr. Elaine Tirado has no previous diagnosis of CAD. He has history of non-insulin-dependent diabetes mellitus, hypertension, hyperlipidemia, family history of early CAD, tobacco use for last 40 years. In May 2020, he developed unstable angina and ACS but did not seek medical attention. In May 2020, he had coronary angiography with accelerating angina. He has super dominant RCA which serial 80-90% occlusion. He had circumflex which was small to moderate in size with proximal 90%. He had distal LAD 70 to 80%; however, his LAD was quite small. Because of the small caliber of LAD and circumflex, he had percutaneous intervention of his super dominant RCA and subsequently has staged LAD and circumflex revascularization. He tolerated procedure well. · CAD:    Symptomatically stable. · BP:    His blood pressures well controlled. ·  HR:     His heart rate is stable. ·  CHF:    There is no evidence of decompensated CHF noted. · Weight:    His weight today is 219 pounds. His baseline weight is 220 pounds. Ideally, his weight should be down about 200 pounds. · Cholesterol:   Target LDL <70. Crestor 20. · Tobacco:    He stopped tobacco use around middle of May 2020. · Anti-platelet:   Remains on ASA, and Brilinta. I will see him back in 6 months. Thank you. Encounter Diagnoses   Name Primary?  Chest pain, unspecified type Yes     current treatment plan is effective, no change in therapy  lab results and schedule of future lab studies reviewed with patient  reviewed diet, exercise and weight control  very strongly urged to quit smoking to reduce cardiovascular risk  cardiovascular risk and specific lipid/LDL goals reviewed  use of aspirin to prevent MI and TIA's discussed      HPI   Mr. Jama Garcia has no complaints of chest pains, increased shortness of breath or decreased exercise capacity. Since his revascularization, he feels he is more energetic with less dyspnea on exertion. He is in great spirits. He states that he has been quite active recently without difficulty. He denies any orthopnea or PND. He denies any palpitations or dizziness. He has successfully discontinued tobacco use. Review of Systems   Respiratory: Negative for shortness of breath. Cardiovascular: Negative for chest pain, palpitations, orthopnea, claudication, leg swelling and PND. All other systems reviewed and are negative. Physical Exam  Vitals signs and nursing note reviewed. Constitutional:       Appearance: Normal appearance. HENT:      Head: Normocephalic and atraumatic. Eyes:      Extraocular Movements: Extraocular movements intact. Conjunctiva/sclera: Conjunctivae normal.   Neck:      Musculoskeletal: No neck rigidity. Cardiovascular:      Rate and Rhythm: Normal rate and regular rhythm. Pulmonary:      Breath sounds: Normal breath sounds. Abdominal:      General: Bowel sounds are normal.      Palpations: Abdomen is soft. Musculoskeletal:         General: No swelling. Skin:     General: Skin is warm and dry. Neurological:      General: No focal deficit present. Mental Status: He is alert and oriented to person, place, and time. Psychiatric:         Mood and Affect: Mood normal.         Behavior: Behavior normal.         PCP: Mack Barry MD    Past Medical History:   Diagnosis Date    Diabetes mellitus ([facility] [address]     Elevated prostate specific antigen (PSA) 1/2013    Hypertension     UTI (lower urinary tract infection) 1/2013       Past Surgical History:   Procedure Laterality Date    HX WRIST FRACTURE [place]  2002    Dr. Nicole Fernandez       Current Outpatient Medications   Medication Sig Dispense Refill    ticagrelor (BRILINTA) 90 mg tablet Take 1 Tab by mouth two (2) times a day. 60 Tab 11    aspirin delayed-release 81 mg tablet Take 1 Tab by mouth daily.  30 Tab 5    metoprolol succinate (TOPROL-XL) 50 mg XL tablet Take 1 Tab by mouth daily. 30 Tab 2    metFORMIN (GLUCOPHAGE) 1,000 mg tablet Take 1,000 mg by mouth daily.  rosuvastatin (CRESTOR) 20 mg tablet Take 20 mg by mouth nightly.  dapagliflozin-metformin (Xigduo XR) 5-1,000 mg TBph Take  by mouth.  lisinopril (PRINIVIL, ZESTRIL) 10 mg tablet Take  by mouth daily. The patient has a family history of    Social History     Tobacco Use    Smoking status: Former Smoker     Packs/day: 0.80     Years: 30.00     Pack years: 24.00     Types: Cigarettes    Smokeless tobacco: Never Used   Substance Use Topics    Alcohol use:  Yes     Alcohol/week: 11.7 standard drinks     Types: 14 Standard drinks or equivalent per week    Drug use: No       Lab Results   Component Value Date/Time    Cholesterol, total 90 05/15/2020 05:32 AM    HDL Cholesterol 31 (L) 05/15/2020 05:32 AM    LDL, calculated 35.8 05/15/2020 05:32 AM    Triglyceride 116 05/15/2020 05:32 AM    CHOL/HDL Ratio 2.9 05/15/2020 05:32 AM        BP Readings from Last 3 Encounters:   06/18/20 132/74   05/15/20 130/80   05/12/20 110/72        Pulse Readings from Last 3 Encounters:   06/18/20 80   05/15/20 77   05/12/20 (!) 113       Wt Readings from Last 3 Encounters:   06/18/20 99.3 kg (219 lb)   05/15/20 97.6 kg (215 lb 3.2 oz)   05/12/20 98.9 kg (218 lb)         EKG: normal sinus rhythm, nonspecific ST and T waves changes, T wave inversion in 3, aVF, V5 and V6.

## 2020-08-11 RX ORDER — METOPROLOL SUCCINATE 50 MG/1
50 TABLET, EXTENDED RELEASE ORAL DAILY
Qty: 30 TAB | Refills: 6 | Status: SHIPPED | OUTPATIENT
Start: 2020-08-11 | End: 2021-02-26

## 2020-11-12 RX ORDER — ASPIRIN 81 MG/1
TABLET ORAL
Qty: 90 TAB | Refills: 5 | Status: SHIPPED | OUTPATIENT
Start: 2020-11-12 | End: 2021-11-29

## 2020-12-15 ENCOUNTER — OFFICE VISIT (OUTPATIENT)
Dept: CARDIOLOGY CLINIC | Age: 60
End: 2020-12-15
Payer: COMMERCIAL

## 2020-12-15 VITALS
HEIGHT: 71 IN | HEART RATE: 86 BPM | WEIGHT: 226 LBS | BODY MASS INDEX: 31.64 KG/M2 | SYSTOLIC BLOOD PRESSURE: 116 MMHG | OXYGEN SATURATION: 98 % | DIASTOLIC BLOOD PRESSURE: 70 MMHG

## 2020-12-15 DIAGNOSIS — R07.9 CHEST PAIN, UNSPECIFIED TYPE: Primary | ICD-10-CM

## 2020-12-15 PROCEDURE — 93000 ELECTROCARDIOGRAM COMPLETE: CPT | Performed by: INTERNAL MEDICINE

## 2020-12-15 PROCEDURE — 99214 OFFICE O/P EST MOD 30 MIN: CPT | Performed by: INTERNAL MEDICINE

## 2020-12-15 NOTE — PROGRESS NOTES
HISTORY OF PRESENT ILLNESS  Kate Cifuentes is a 62 y.o. male. ASSESSMENT and PLAN    Mr. Kate Cifuentes has no previous diagnosis of CAD. Lise Brunner has history of non-insulin-dependent diabetes mellitus, hypertension, hyperlipidemia, family history of early CAD, tobacco use for last 36 years.    In May 2020, he developed unstable angina and ACS but did not seek medical attention. In May 2020, he had coronary angiography with accelerating angina. He has super dominant RCA which serial 80-90% occlusion. He had circumflex which was small to moderate in size with proximal 90%. He had distal LAD 70-80%; however, his LAD was quite small. Because of the small caliber of LAD and circumflex, he had percutaneous intervention of his super dominant RCA and subsequently has staged LAD and circumflex revascularization. He tolerated procedure well. He had a friend of his neighbors move in. She is a good cook and he has put on some weight.  CAD:    Symptomatically stable.  BP:    Well controlled.  Rhythm:    Remains in sinus rhythm, stable.  CHF:    There is no evidence of decompensated CHF noted.  Weight:    His weight today is 226 pounds. His baseline weight is 220 pounds. Again, strong encouragement was given for weight control.  Cholesterol:  Target LDL<70. Crestor 20.  Tobacco:    He discontinued tobacco use in May 2020 and has successfully stayed off of it.  Anticoagulant:  Remains on ASA, and Brilinta. I will see him back in 6 months. Thank you. Encounter Diagnoses   Name Primary?     Chest pain, unspecified type Yes     current treatment plan is effective, no change in therapy  lab results and schedule of future lab studies reviewed with patient  reviewed diet, exercise and weight control  very strongly urged to quit smoking to reduce cardiovascular risk  cardiovascular risk and specific lipid/LDL goals reviewed  use of aspirin to prevent MI and TIA's discussed      HPI Today, Mr. Micheal Schultz has no complaints of chest pains, increased shortness of breath or decreased exercise capacity. He feels that his exercise capacity has improved significantly since his coronary revascularization. He has not had any further chest pains. He is tolerating his medications without difficulty. He has successfully stayed off of tobacco use since May 2020. Unfortunately, he has gained some weight. He denies any orthopnea or PND. He denies any palpitations or dizziness. Review of Systems   Respiratory: Negative for shortness of breath. Cardiovascular: Negative for chest pain, palpitations, orthopnea, claudication, leg swelling and PND. All other systems reviewed and are negative. Physical Exam  Vitals signs and nursing note reviewed. Constitutional:       Appearance: Normal appearance. HENT:      Head: Normocephalic. Eyes:      Conjunctiva/sclera: Conjunctivae normal.   Neck:      Musculoskeletal: No neck rigidity. Cardiovascular:      Rate and Rhythm: Normal rate and regular rhythm. Pulmonary:      Breath sounds: Normal breath sounds. Abdominal:      Palpations: Abdomen is soft. Musculoskeletal:         General: No swelling. Skin:     General: Skin is warm and dry. Neurological:      General: No focal deficit present. Mental Status: He is alert and oriented to person, place, and time.    Psychiatric:         Mood and Affect: Mood normal.         Behavior: Behavior normal.         PCP: Diego Hayden MD    Past Medical History:   Diagnosis Date    [facility]     Elevated prostate specific antigen (PSA) 1/2013    Hypertension     UTI (lower urinary tract infection) 1/2013       Past Surgical History:   Procedure Laterality Date    HX WRIST FRACTURE [place]  2002    Dr. Giovanny Denny       Current Outpatient Medications   Medication Sig Dispense Refill    aspirin delayed-release 81 mg tablet take 1 tablet by mouth once daily 90 Tab 5    metoprolol succinate (TOPROL-XL) 50 mg XL tablet Take 1 Tab by mouth daily. 30 Tab 6    ticagrelor (BRILINTA) 90 mg tablet Take 1 Tab by mouth two (2) times a day. 60 Tab 11    metFORMIN (GLUCOPHAGE) 1,000 mg tablet Take 1,000 mg by mouth daily.  rosuvastatin (CRESTOR) 20 mg tablet Take 20 mg by mouth nightly.  dapagliflozin-metformin (Xigduo XR) 5-1,000 mg TBph Take  by mouth.  lisinopril (PRINIVIL, ZESTRIL) 10 mg tablet Take  by mouth daily. The patient has a family history of    Social History     Tobacco Use    Smoking status: Former Smoker     Packs/day: 0.80     Years: 30.00     Pack years: 24.00     Types: Cigarettes    Smokeless tobacco: Never Used   Substance Use Topics    Alcohol use:  Yes     Alcohol/week: 11.7 standard drinks     Types: 14 Standard drinks or equivalent per week    Drug use: No       Lab Results   Component Value Date/Time    Cholesterol, total 90 05/15/2020 05:32 AM    HDL Cholesterol 31 (L) 05/15/2020 05:32 AM    LDL, calculated 35.8 05/15/2020 05:32 AM    Triglyceride 116 05/15/2020 05:32 AM    CHOL/HDL Ratio 2.9 05/15/2020 05:32 AM        BP Readings from Last 3 Encounters:   12/15/20 116/70   06/18/20 132/74   05/15/20 130/80        Pulse Readings from Last 3 Encounters:   12/15/20 86   06/18/20 80   05/15/20 77       Wt Readings from Last 3 Encounters:   12/15/20 102.5 kg (226 lb)   06/18/20 99.3 kg (219 lb)   05/15/20 97.6 kg (215 lb 3.2 oz)         EKG: unchanged from previous tracings, normal sinus rhythm, occasional PVC noted, unifocal.

## 2020-12-15 NOTE — PROGRESS NOTES
Lulu Hunt presents today for   Chief Complaint   Patient presents with    Follow-up     6 month follow up        Lulu Hunt preferred language for health care discussion is english/other. Is someone accompanying this pt? no    Is the patient using any DME equipment during [address]? no    Depression Screening:  3 most recent PHQ Screens 5/12/2020   Little interest or pleasure in doing things Not at all   Feeling down, depressed, irritable, or hopeless Not at all   Total Score PHQ 2 0       Learning Assessment:  Learning Assessment 5/12/2020   PRIMARY LEARNER Patient   HIGHEST LEVEL OF EDUCATION - PRIMARY LEARNER  GRADUATED HIGH SCHOOL OR GED   BARRIERS PRIMARY LEARNER NONE   CO-LEARNER CAREGIVER No   CO-LEARNER NAME no   PRIMARY LANGUAGE ENGLISH   LEARNER PREFERENCE PRIMARY DEMONSTRATION   ANSWERED BY patient   RELATIONSHIP SELF       Abuse Screening:  Abuse Screening Questionnaire 5/12/2020   Do you ever feel afraid of your partner? N   Are you in a relationship with someone who physically or mentally threatens you? N   Is it safe for you to go home? Y       Fall Risk  Fall Risk Assessment, last 12 mths 5/12/2020   Able to walk? Yes   Fall in past 12 months? No       Pt currently taking Anticoagulant therapy? ASA 81mg every day and Brilinta    Coordination of Care:  1. Have you been to the ER, urgent care clinic since your last visit? Hospitalized since your last visit? no    2. Have you seen or consulted any other health care providers outside of the [address] [place] [address] since your last visit? Include any pap smears or colon screening.  no

## 2021-02-26 RX ORDER — METOPROLOL SUCCINATE 50 MG/1
TABLET, EXTENDED RELEASE ORAL
Qty: 30 TAB | Refills: 6 | Status: SHIPPED | OUTPATIENT
Start: 2021-02-26 | End: 2021-09-27

## 2021-06-01 RX ORDER — TICAGRELOR 90 MG/1
TABLET ORAL
Qty: 60 TABLET | Refills: 11 | Status: SHIPPED | OUTPATIENT
Start: 2021-06-01 | End: 2022-06-03

## 2021-09-27 RX ORDER — METOPROLOL SUCCINATE 50 MG/1
TABLET, EXTENDED RELEASE ORAL
Qty: 30 TABLET | Refills: 6 | Status: SHIPPED | OUTPATIENT
Start: 2021-09-27

## 2021-11-29 RX ORDER — ASPIRIN 81 MG/1
TABLET ORAL
Qty: 90 TABLET | Refills: 5 | Status: SHIPPED | OUTPATIENT
Start: 2021-11-29

## 2022-03-19 PROBLEM — I24.9 ACS (ACUTE CORONARY SYNDROME) (HCC): Status: ACTIVE | Noted: 2020-05-13

## 2022-06-03 RX ORDER — TICAGRELOR 90 MG/1
TABLET ORAL
Qty: 60 TABLET | Refills: 11 | Status: SHIPPED | OUTPATIENT
Start: 2022-06-03

## 2022-12-01 RX ORDER — ASPIRIN 81 MG/1
TABLET ORAL
Qty: 90 TABLET | Refills: 5 | Status: SHIPPED | OUTPATIENT
Start: 2022-12-01

## 2023-05-16 ENCOUNTER — PREP FOR PROCEDURE (OUTPATIENT)
Age: 63
End: 2023-05-16

## 2023-05-16 ENCOUNTER — HOSPITAL ENCOUNTER (OUTPATIENT)
Facility: HOSPITAL | Age: 63
Discharge: HOME | End: 2023-05-19
Payer: COMMERCIAL

## 2023-05-16 ENCOUNTER — OFFICE VISIT (OUTPATIENT)
Age: 63
End: 2023-05-16
Payer: COMMERCIAL

## 2023-05-16 VITALS
BODY MASS INDEX: 30.38 KG/M2 | WEIGHT: 217 LBS | HEIGHT: 71 IN | HEART RATE: 98 BPM | OXYGEN SATURATION: 97 % | SYSTOLIC BLOOD PRESSURE: 114 MMHG | DIASTOLIC BLOOD PRESSURE: 80 MMHG

## 2023-05-16 DIAGNOSIS — R07.9 CHEST PAIN, UNSPECIFIED TYPE: Primary | ICD-10-CM

## 2023-05-16 DIAGNOSIS — R07.9 CHEST PAIN, UNSPECIFIED TYPE: ICD-10-CM

## 2023-05-16 LAB
ALBUMIN SERPL-MCNC: 4.6 G/DL (ref 3.4–5)
ALBUMIN/GLOB SERPL: 1.4 (ref 0.8–1.7)
ALP SERPL-CCNC: 70 U/L (ref 45–117)
ALT SERPL-CCNC: 33 U/L (ref 16–61)
ANION GAP SERPL CALC-SCNC: 9 MMOL/L (ref 3–18)
AST SERPL-CCNC: 28 U/L (ref 10–38)
BILIRUB SERPL-MCNC: 1.9 MG/DL (ref 0.2–1)
BUN SERPL-MCNC: 19 MG/DL (ref 7–18)
BUN/CREAT SERPL: 16 (ref 12–20)
CALCIUM SERPL-MCNC: 9.5 MG/DL (ref 8.5–10.1)
CHLORIDE SERPL-SCNC: 100 MMOL/L (ref 100–111)
CO2 SERPL-SCNC: 27 MMOL/L (ref 21–32)
CREAT SERPL-MCNC: 1.18 MG/DL (ref 0.6–1.3)
ERYTHROCYTE [DISTWIDTH] IN BLOOD BY AUTOMATED COUNT: 12.9 % (ref 11.6–14.5)
GLOBULIN SER CALC-MCNC: 3.4 G/DL (ref 2–4)
GLUCOSE SERPL-MCNC: 122 MG/DL (ref 74–99)
HCT VFR BLD AUTO: 45.9 % (ref 36–48)
HGB BLD-MCNC: 15.3 G/DL (ref 13–16)
INR PPP: 0.9 (ref 0.8–1.2)
MCH RBC QN AUTO: 27.9 PG (ref 24–34)
MCHC RBC AUTO-ENTMCNC: 33.3 G/DL (ref 31–37)
MCV RBC AUTO: 83.8 FL (ref 78–100)
NRBC # BLD: 0 K/UL (ref 0–0.01)
NRBC BLD-RTO: 0 PER 100 WBC
PLATELET # BLD AUTO: 225 K/UL (ref 135–420)
PMV BLD AUTO: 9.6 FL (ref 9.2–11.8)
POTASSIUM SERPL-SCNC: 4.4 MMOL/L (ref 3.5–5.5)
PROT SERPL-MCNC: 8 G/DL (ref 6.4–8.2)
PROTHROMBIN TIME: 12.4 SEC (ref 11.5–15.2)
RBC # BLD AUTO: 5.48 M/UL (ref 4.35–5.65)
SODIUM SERPL-SCNC: 136 MMOL/L (ref 136–145)
WBC # BLD AUTO: 9.5 K/UL (ref 4.6–13.2)

## 2023-05-16 PROCEDURE — 80053 COMPREHEN METABOLIC PANEL: CPT

## 2023-05-16 PROCEDURE — 93000 ELECTROCARDIOGRAM COMPLETE: CPT | Performed by: INTERNAL MEDICINE

## 2023-05-16 PROCEDURE — 99204 OFFICE O/P NEW MOD 45 MIN: CPT | Performed by: INTERNAL MEDICINE

## 2023-05-16 PROCEDURE — 85027 COMPLETE CBC AUTOMATED: CPT

## 2023-05-16 PROCEDURE — 85610 PROTHROMBIN TIME: CPT

## 2023-05-16 PROCEDURE — 36415 COLL VENOUS BLD VENIPUNCTURE: CPT

## 2023-05-16 PROCEDURE — 71046 X-RAY EXAM CHEST 2 VIEWS: CPT

## 2023-05-16 RX ORDER — AMLODIPINE BESYLATE 10 MG/1
10 TABLET ORAL DAILY
COMMUNITY
Start: 2023-05-01

## 2023-05-16 RX ORDER — EMPAGLIFLOZIN, LINAGLIPTIN, METFORMIN HYDROCHLORIDE 25; 5; 1000 MG/1; MG/1; MG/1
1 TABLET, EXTENDED RELEASE ORAL DAILY
COMMUNITY
Start: 2023-04-24

## 2023-05-16 RX ORDER — NITROGLYCERIN 0.4 MG/1
0.4 TABLET SUBLINGUAL EVERY 5 MIN PRN
Qty: 25 TABLET | Refills: 0 | Status: SHIPPED | OUTPATIENT
Start: 2023-05-16

## 2023-05-16 ASSESSMENT — PATIENT HEALTH QUESTIONNAIRE - PHQ9
1. LITTLE INTEREST OR PLEASURE IN DOING THINGS: 0
2. FEELING DOWN, DEPRESSED OR HOPELESS: 0
SUM OF ALL RESPONSES TO PHQ QUESTIONS 1-9: 0
SUM OF ALL RESPONSES TO PHQ9 QUESTIONS 1 & 2: 0
SUM OF ALL RESPONSES TO PHQ QUESTIONS 1-9: 0

## 2023-05-16 NOTE — PROGRESS NOTES
Jose Guadalupe Garcia presents today for   Chief Complaint   Patient presents with    Follow-up     Overdue f/u     Chest Pain     Center chest tightness on/off with exertion     Palpitations     Racing palps on/off    Shortness of Breath     SOB with exertion     Fatigue     Extreme fatigue     Fall     Fell 1x last month caused injury to left eye        Jose Guadalupe Garcia preferred language for health care discussion is english/other. Is someone accompanying this pt? no    Is the patient using any DME equipment during OV? no    Depression Screening:  Depression: Not at risk    PHQ-2 Score: 0        Learning Assessment:  Who is the primary learner? Patient    What is the preferred language for health care of the primary learner? ENGLISH    How does the primary learner prefer to learn new concepts? DEMONSTRATION    Answered By patient    Relationship to Learner SELF           Pt currently taking Anticoagulant therapy? no    Pt currently taking Antiplatelet therapy ? ASA 81 mg 1x daily and Brilinta 90 mg 2x daily       Coordination of Care:  1. Have you been to the ER, urgent care clinic since your last visit? Hospitalized since your last visit? yes    2. Have you seen or consulted any other health care providers outside of the [address] [place] [address] since your last visit? Include any pap smears or colon screening.  no

## 2023-05-16 NOTE — PROGRESS NOTES
HISTORY OF PRESENT ILLNESS  Debbie Gillis  65 y.o. male     Chief Complaint   Patient presents with    Follow-up     [facility] f/u     Chest Pain     Center chest tightness on/off with exertion     Palpitations     Racing palps on/off    Shortness of Breath     SOB with exertion     Fatigue     Extreme fatigue        ASSESSMENT and PLAN    The encounter diagnosis was Chest pain, unspecified type. Mr. Debbie Gillis has diagnosis of CAD. He has history of non-insulin-dependent diabetes mellitus, hypertension, hyperlipidemia, family history of early CAD, tobacco use for over 40 years. In May 2020, he developed unstable angina and ACS but initially did not seek medical attention. Later in May 2020, he had coronary angiography with accelerating angina. His super dominant RCA had serial 80-90% occlusion. He had circumflex which was small to moderate in size with proximal 90%. He had distal LAD 70-80%; however, his LAD was quite small. Because of the small caliber of LAD and circumflex, he had percutaneous intervention of his super dominant RCA and subsequently has staged LAD and circumflex revascularization. He tolerated procedure well. He had a friend of his move in to the neighborhood; she is a good cook and he has put on some weight. He has stayed off of tobacco since 2020. He was lost to follow-up between December 2020 and May 2023 when he developed recurrent exertional chest pains. CAD:    He has recurrent exertional chest tightness and SLATER. This began around February 2023. BP:    Well controlled at 114/80. Rhythm:    Sinus rhythm at 90 bpm.  CHF:    There is no evidence of decompensated CHF noted. Weight:     His weight today is 217 pounds. His baseline weight is 220 pounds. Cholesterol:   Target LDL <70. Crestor 20. Tobacco:    He discontinued tobacco use in May 2020 and has successfully stayed off of it. Anticoagulant:  Remains on ASA, and Brilinta.        With his

## 2023-05-16 NOTE — PATIENT INSTRUCTIONS
Catheterization Instructions       You are scheduled to have a Left Heart Catheterization  on 5/22/2023  at 9:15am.    Please check in at 7:45am .      [facility] to [facility] and park in the outpatient parking lot that is located around to the back of the hospital and enter through the [facility] building. Once you enter through the [facility] check in with the  there. The  will either give you directions or assist you in getting to the cath holding area. [facility] [address], [address])     You are not to eat or drink anything after midnight the night before your procedure. Small sips of water to take your medications is ok. If you are diabetic, do not take your insulin/sugar pill the morning of the procedure. MEDICATION INSTRUCTIONS:   Please take your morning medications with the following special instructions:           [x]          Please make sure to take your Blood pressure medication. [x]          Take your Aspirin and Brilinta. [x]          Hold (DO NOT TAKE) your Metformin on  5/20/2023 and do not resume it until after you have the blood work done on  5/24/2023 and have called office to verify if ok to resume. We encourage families to wait in the waiting room on the first floor while the procedure is being done. The Doctor will come out and talk with you as soon as the procedure is over. You will need someone to drive you home after you have been discharged from the hospital.     There is the possibility that you may spend the night in the hospital, depending on the results of the procedure. This will be determined after the procedure is done. If angioplasty or stent is planned, you will stay at least one day. If you or your family have any questions, please call our office Monday -Friday, 8:30 a.m.-4:30 p.m.,  at [phone], and ask to speak to one of the nurses.

## 2023-05-22 ENCOUNTER — HOSPITAL ENCOUNTER (OUTPATIENT)
Facility: HOSPITAL | Age: 63
Setting detail: OBSERVATION
Discharge: HOME | End: 2023-05-23
Attending: INTERNAL MEDICINE | Admitting: INTERNAL MEDICINE
Payer: COMMERCIAL

## 2023-05-22 DIAGNOSIS — I25.110 CORONARY ARTERY DISEASE INVOLVING NATIVE CORONARY ARTERY OF NATIVE HEART WITH UNSTABLE ANGINA PECTORIS (HCC): Primary | ICD-10-CM

## 2023-05-22 DIAGNOSIS — I20.0 UNSTABLE ANGINA (HCC): ICD-10-CM

## 2023-05-22 DIAGNOSIS — R07.9 CHEST PAIN, UNSPECIFIED TYPE: ICD-10-CM

## 2023-05-22 PROBLEM — I25.10 CAD (CORONARY ARTERY DISEASE): Status: ACTIVE | Noted: 2023-05-22

## 2023-05-22 LAB — ECHO BSA: 2.22 M2

## 2023-05-22 PROCEDURE — 92928 PRQ TCAT PLMT NTRAC ST 1 LES: CPT | Performed by: INTERNAL MEDICINE

## 2023-05-22 PROCEDURE — 2500000003 HC RX 250 WO HCPCS: Performed by: INTERNAL MEDICINE

## 2023-05-22 PROCEDURE — C1887 CATHETER, GUIDING: HCPCS | Performed by: INTERNAL MEDICINE

## 2023-05-22 PROCEDURE — 6370000000 HC RX 637 (ALT 250 FOR IP): Performed by: INTERNAL MEDICINE

## 2023-05-22 PROCEDURE — G0378 HOSPITAL OBSERVATION PER HR: HCPCS

## 2023-05-22 PROCEDURE — C1874 STENT, COATED/COV W/DEL SYS: HCPCS | Performed by: INTERNAL MEDICINE

## 2023-05-22 PROCEDURE — 99152 MOD SED SAME PHYS/QHP 5/>YRS: CPT | Performed by: INTERNAL MEDICINE

## 2023-05-22 PROCEDURE — 2580000003 HC RX 258: Performed by: INTERNAL MEDICINE

## 2023-05-22 PROCEDURE — 93458 L HRT ARTERY/VENTRICLE ANGIO: CPT | Performed by: INTERNAL MEDICINE

## 2023-05-22 PROCEDURE — C9601 PERC DRUG-EL COR STENT BRAN: HCPCS | Performed by: INTERNAL MEDICINE

## 2023-05-22 PROCEDURE — C1725 CATH, TRANSLUMIN NON-LASER: HCPCS | Performed by: INTERNAL MEDICINE

## 2023-05-22 PROCEDURE — C1894 INTRO/SHEATH, NON-LASER: HCPCS | Performed by: INTERNAL MEDICINE

## 2023-05-22 PROCEDURE — 99153 MOD SED SAME PHYS/QHP EA: CPT | Performed by: INTERNAL MEDICINE

## 2023-05-22 PROCEDURE — 92929 PR PRQ TRLUML CORONARY STENT W/ANGIO ADDL ART/BRNCH: CPT | Performed by: INTERNAL MEDICINE

## 2023-05-22 PROCEDURE — C1769 GUIDE WIRE: HCPCS | Performed by: INTERNAL MEDICINE

## 2023-05-22 PROCEDURE — 76000 FLUOROSCOPY <1 HR PHYS/QHP: CPT | Performed by: INTERNAL MEDICINE

## 2023-05-22 PROCEDURE — C1713 ANCHOR/SCREW BN/BN,TIS/BN: HCPCS | Performed by: INTERNAL MEDICINE

## 2023-05-22 PROCEDURE — 2709999900 HC NON-CHARGEABLE SUPPLY: Performed by: INTERNAL MEDICINE

## 2023-05-22 PROCEDURE — 6360000002 HC RX W HCPCS: Performed by: INTERNAL MEDICINE

## 2023-05-22 DEVICE — XIENCE SIERRA™ EVEROLIMUS ELUTING CORONARY STENT SYSTEM 2.50 MM X 12 MM / RAPID-EXCHANGE
Type: IMPLANTABLE DEVICE | Status: FUNCTIONAL
Brand: XIENCE SIERRA™

## 2023-05-22 RX ORDER — SODIUM CHLORIDE 9 MG/ML
INJECTION, SOLUTION INTRAVENOUS PRN
Status: DISCONTINUED | OUTPATIENT
Start: 2023-05-22 | End: 2023-05-22 | Stop reason: HOSPADM

## 2023-05-22 RX ORDER — SODIUM CHLORIDE 0.9 % (FLUSH) 0.9 %
5-40 SYRINGE (ML) INJECTION EVERY 12 HOURS SCHEDULED
Status: CANCELLED | OUTPATIENT
Start: 2023-05-22

## 2023-05-22 RX ORDER — SODIUM CHLORIDE 0.9 % (FLUSH) 0.9 %
5-40 SYRINGE (ML) INJECTION PRN
Status: DISCONTINUED | OUTPATIENT
Start: 2023-05-22 | End: 2023-05-22 | Stop reason: HOSPADM

## 2023-05-22 RX ORDER — NITROGLYCERIN 20 MG/100ML
INJECTION INTRAVENOUS PRN
Status: DISCONTINUED | OUTPATIENT
Start: 2023-05-22 | End: 2023-05-22 | Stop reason: HOSPADM

## 2023-05-22 RX ORDER — METOPROLOL SUCCINATE 50 MG/1
50 TABLET, EXTENDED RELEASE ORAL DAILY
Status: DISCONTINUED | OUTPATIENT
Start: 2023-05-22 | End: 2023-05-23 | Stop reason: HOSPADM

## 2023-05-22 RX ORDER — LISINOPRIL 10 MG/1
10 TABLET ORAL DAILY
Status: DISCONTINUED | OUTPATIENT
Start: 2023-05-22 | End: 2023-05-23 | Stop reason: HOSPADM

## 2023-05-22 RX ORDER — ROSUVASTATIN CALCIUM 20 MG/1
20 TABLET, COATED ORAL DAILY
Status: DISCONTINUED | OUTPATIENT
Start: 2023-05-22 | End: 2023-05-23 | Stop reason: HOSPADM

## 2023-05-22 RX ORDER — NITROGLYCERIN 0.4 MG/1
0.4 TABLET SUBLINGUAL EVERY 5 MIN PRN
Status: DISCONTINUED | OUTPATIENT
Start: 2023-05-22 | End: 2023-05-23 | Stop reason: HOSPADM

## 2023-05-22 RX ORDER — SODIUM CHLORIDE 0.9 % (FLUSH) 0.9 %
5-40 SYRINGE (ML) INJECTION EVERY 12 HOURS SCHEDULED
Status: DISCONTINUED | OUTPATIENT
Start: 2023-05-22 | End: 2023-05-22 | Stop reason: HOSPADM

## 2023-05-22 RX ORDER — AMLODIPINE BESYLATE 10 MG/1
10 TABLET ORAL DAILY
Status: DISCONTINUED | OUTPATIENT
Start: 2023-05-22 | End: 2023-05-23 | Stop reason: HOSPADM

## 2023-05-22 RX ORDER — MIDAZOLAM HYDROCHLORIDE 1 MG/ML
INJECTION INTRAMUSCULAR; INTRAVENOUS PRN
Status: DISCONTINUED | OUTPATIENT
Start: 2023-05-22 | End: 2023-05-22 | Stop reason: HOSPADM

## 2023-05-22 RX ORDER — HEPARIN SODIUM 1000 [USP'U]/ML
INJECTION, SOLUTION INTRAVENOUS; SUBCUTANEOUS PRN
Status: DISCONTINUED | OUTPATIENT
Start: 2023-05-22 | End: 2023-05-22 | Stop reason: HOSPADM

## 2023-05-22 RX ORDER — FENTANYL CITRATE 50 UG/ML
INJECTION, SOLUTION INTRAMUSCULAR; INTRAVENOUS PRN
Status: DISCONTINUED | OUTPATIENT
Start: 2023-05-22 | End: 2023-05-22 | Stop reason: HOSPADM

## 2023-05-22 RX ORDER — BIVALIRUDIN 250 MG/5ML
INJECTION, POWDER, LYOPHILIZED, FOR SOLUTION INTRAVENOUS PRN
Status: DISCONTINUED | OUTPATIENT
Start: 2023-05-22 | End: 2023-05-22 | Stop reason: HOSPADM

## 2023-05-22 RX ORDER — ASPIRIN 81 MG/1
81 TABLET, CHEWABLE ORAL DAILY
Status: DISCONTINUED | OUTPATIENT
Start: 2023-05-22 | End: 2023-05-23 | Stop reason: HOSPADM

## 2023-05-22 RX ORDER — SODIUM CHLORIDE 9 MG/ML
INJECTION, SOLUTION INTRAVENOUS PRN
Status: CANCELLED | OUTPATIENT
Start: 2023-05-22

## 2023-05-22 RX ORDER — VERAPAMIL HYDROCHLORIDE 2.5 MG/ML
INJECTION, SOLUTION INTRAVENOUS PRN
Status: DISCONTINUED | OUTPATIENT
Start: 2023-05-22 | End: 2023-05-22 | Stop reason: HOSPADM

## 2023-05-22 RX ORDER — SODIUM CHLORIDE 0.9 % (FLUSH) 0.9 %
5-40 SYRINGE (ML) INJECTION PRN
Status: CANCELLED | OUTPATIENT
Start: 2023-05-22

## 2023-05-22 RX ADMIN — TICAGRELOR 90 MG: 90 TABLET ORAL at 22:06

## 2023-05-22 NOTE — H&P
Addendum:    Patient with known history of CAD presents with recurrent accelerating exertional angina. We will proceed with repeat coronary angiography. All questions answered. He agrees with the plan. Candace Pierce MD   5/22/2023       HISTORY OF PRESENT ILLNESS  Salima Alicea  65 y.o. male           Chief Complaint   Patient presents with    Follow-up       Overdue f/u     Chest Pain       Center chest tightness on/off with exertion     Palpitations       Racing palps on/off    Shortness of Breath       SOB with exertion     Fatigue       Extreme fatigue          ASSESSMENT and PLAN     The encounter diagnosis was Chest pain, unspecified type. . Salima Alicea has diagnosis of CAD. He has history of non-insulin-dependent diabetes mellitus, hypertension, hyperlipidemia, family history of early CAD, tobacco use for over 40 years. In May 2020, he developed unstable angina and ACS but initially did not seek medical attention. Later in May 2020, he had coronary angiography with accelerating angina. His super dominant RCA had serial 80-90% occlusion. He had circumflex which was small to moderate in size with proximal 90%. He had distal LAD 70-80%; however, his LAD was quite small. Because of the small caliber of LAD and circumflex, he had percutaneous intervention of his super dominant RCA and subsequently has staged LAD and circumflex revascularization. He tolerated procedure well. He had a friend of his move in to the neighborhood; she is a good cook and he has put on some weight. He has stayed off of tobacco since 2020. He was lost to follow-up between December 2020 and May 2023 when he developed recurrent exertional chest pains. CAD:    He has recurrent exertional chest tightness and SLATER. This began around February 2023. BP:    Well controlled at 114/80. Rhythm:    Sinus rhythm at 90 bpm.  CHF:    There is no evidence of decompensated CHF noted.   Weight:     His weight

## 2023-05-22 NOTE — PROGRESS NOTES
Cath holding summary:    0715: Patient ambulated from waiting area without difficulty, placed on monitor 5. A&O, no c/o. ID, NPO status, allergies, home meds verified. PIV x2 inserted, groin prep completed. Consent ready for signature. 1030: Verbal report received from Tree Calvo on Rai Ospina  being received from [address] [place] [address] for routine post-op. Report consisted of patient's Situation, Background, Assessment and Recommendations (SBAR). Information from the following report(s) MAR and Event Log was reviewed with the receiving nurse. Opportunity for questions and clarification was provided. Assessment completed upon patient's arrival to unit and care assumed. 1200: D-stat band removed from right wrist per protocol. No bleeding or hematoma noted, site covered with hemostatic dressing and tegaderm. Patient denies pain, pulse palpable and brisk cap refill distal to site. Cath site instructions and post care including s/s of bleeding and methods of bleeding prevention reviewed with patient who verbalized understanding. 1730: Verbal report given to Suleman Hernandez RN on Rai Ospina being transferred to CVT SD for routine progression of patient care. Report consisted of patient's Situation, Background, Assessment and Recommendations (SBAR). Information from the following report(s) Adult Overview and Event Log was reviewed with the receiving nurse. Opportunity for questions and clarification was provided.  Patient transported with: [employer]

## 2023-05-22 NOTE — PROGRESS NOTES
Patient arrived in the unit per wheelchair accompanied by Transport team. Placed patient on telemetry, kept comfortable. Will continue to monitor. Bedside and Verbal shift change report given to [employer], RN and Josselyn Pineda RN (oncoming nurse) by No Ansari RN (offgoing nurse).  Report included the following information Nurse Handoff Report, Index, Intake/Output, MAR, Recent Results, and Cardiac Rhythm SR .

## 2023-05-22 NOTE — PRE SEDATION
Sedation Plan  ASA: class 2 - patient with mild systemic disease     Mallampati class: II - soft palate, uvula, fauces visible. Sedation plan: moderate (conscious sedation)    Risks, benefits, and alternatives discussed with patient. Finasteride Counseling:  I discussed with the patient the risks of use of finasteride including but not limited to decreased libido, decreased ejaculate volume, gynecomastia, and depression. Women should not handle medication.  All of the patient's questions and concerns were addressed. Finasteride Male Counseling: Finasteride Counseling:  I discussed with the patient the risks of use of finasteride including but not limited to decreased libido, decreased ejaculate volume, gynecomastia, and depression. Women should not handle medication.  All of the patient's questions and concerns were addressed.

## 2023-05-23 VITALS
RESPIRATION RATE: 18 BRPM | WEIGHT: 217 LBS | HEART RATE: 76 BPM | SYSTOLIC BLOOD PRESSURE: 129 MMHG | OXYGEN SATURATION: 98 % | HEIGHT: 71 IN | DIASTOLIC BLOOD PRESSURE: 80 MMHG | BODY MASS INDEX: 30.38 KG/M2 | TEMPERATURE: 98.8 F

## 2023-05-23 LAB
ANION GAP SERPL CALC-SCNC: 4 MMOL/L (ref 3–18)
BUN SERPL-MCNC: 12 MG/DL (ref 7–18)
BUN/CREAT SERPL: 11 (ref 12–20)
CALCIUM SERPL-MCNC: 8.6 MG/DL (ref 8.5–10.1)
CHLORIDE SERPL-SCNC: 108 MMOL/L (ref 100–111)
CO2 SERPL-SCNC: 26 MMOL/L (ref 21–32)
CREAT SERPL-MCNC: 1.09 MG/DL (ref 0.6–1.3)
EKG ATRIAL RATE: 75 BPM
EKG DIAGNOSIS: NORMAL
EKG P AXIS: 43 DEGREES
EKG P-R INTERVAL: 184 MS
EKG Q-T INTERVAL: 408 MS
EKG QRS DURATION: 110 MS
EKG QTC CALCULATION (BAZETT): 455 MS
EKG R AXIS: 10 DEGREES
EKG T AXIS: 15 DEGREES
EKG VENTRICULAR RATE: 75 BPM
GLUCOSE SERPL-MCNC: 126 MG/DL (ref 74–99)
POTASSIUM SERPL-SCNC: 4.1 MMOL/L (ref 3.5–5.5)
SODIUM SERPL-SCNC: 138 MMOL/L (ref 136–145)

## 2023-05-23 PROCEDURE — 80048 BASIC METABOLIC PNL TOTAL CA: CPT

## 2023-05-23 PROCEDURE — G0378 HOSPITAL OBSERVATION PER HR: HCPCS

## 2023-05-23 PROCEDURE — 99213 OFFICE O/P EST LOW 20 MIN: CPT | Performed by: INTERNAL MEDICINE

## 2023-05-23 PROCEDURE — 93005 ELECTROCARDIOGRAM TRACING: CPT | Performed by: INTERNAL MEDICINE

## 2023-05-23 PROCEDURE — 6370000000 HC RX 637 (ALT 250 FOR IP): Performed by: INTERNAL MEDICINE

## 2023-05-23 PROCEDURE — 93010 ELECTROCARDIOGRAM REPORT: CPT | Performed by: INTERNAL MEDICINE

## 2023-05-23 PROCEDURE — 36415 COLL VENOUS BLD VENIPUNCTURE: CPT

## 2023-05-23 RX ADMIN — ROSUVASTATIN CALCIUM 20 MG: 20 TABLET, FILM COATED ORAL at 09:07

## 2023-05-23 RX ADMIN — TICAGRELOR 90 MG: 90 TABLET ORAL at 09:07

## 2023-05-23 RX ADMIN — ASPIRIN 81 MG CHEWABLE TABLET 81 MG: 81 TABLET CHEWABLE at 09:08

## 2023-05-23 RX ADMIN — LISINOPRIL 10 MG: 10 TABLET ORAL at 09:07

## 2023-05-23 RX ADMIN — METOPROLOL SUCCINATE 50 MG: 50 TABLET, EXTENDED RELEASE ORAL at 09:07

## 2023-05-23 RX ADMIN — AMLODIPINE BESYLATE 10 MG: 10 TABLET ORAL at 09:07

## 2023-05-23 NOTE — CARE COORDINATION
05/23/23 1010   Service Assessment   Patient Orientation Alert and Oriented;Person;Place;Situation   Cognition Alert   Primary Caregiver Self   Support Systems Friends/Neighbors   PCP Verified by CM Yes   Last Visit to PCP Within last 3 months   Prior Functional Level Independent in ADLs/IADLs   Current Functional Level Independent in ADLs/IADLs   Can patient return to prior living arrangement Yes   Ability to make needs known: Good   Financial Resources Other (Comment)  (BCBS)   Social/Functional History   Lives With Alone   Type of [address] One level   Home Access Level entry   [facility] From Friend(s)   ADL Assistance Independent   Homemaking Assistance Independent   Ambulation Assistance Independent   Transfer Assistance Independent   Active  Yes   Mode of Transportation Car   Occupation Full time employment   Type of Occupation [employer]   Discharge [facility] Prior To Admission None   Potential Assistance Needed N/A   Type of [address] None   Patient expects to be discharged to: [facility] [address] Discharge   Transition of [address] (CM Consult) Other  (outpatient)   Services At/After Discharge Outpatient   Mode of Transport at Discharge Other (see comment)  (friend)   Confirm Follow Up Transport Self

## 2023-05-23 NOTE — CARE COORDINATION
Discharge order noted for today. Pt has orders for cardiac rehab and he is in agreement. Left a message fr Cardiopulmonary Rehab.    Pt stated his neighbor that works in the hospital will be transporting him home when he gets off work at [employer], CISCON RN  Care Management

## 2023-05-23 NOTE — DISCHARGE SUMMARY
CARDIOLOGY DISCHARGE SUMMARY:    Patient: Letitia Zaidi  MR #: [id number]    Admission Date: 5/22/2023  6:46 AM    Discharge Diagnoses: ACS, unstable angina    Discharged Condition: stable    Indication for Admission: Heart catheterization, catheter intervention    Hospital Course: The patient was brought in for a left heart catheterization and coronary arteriography with possible catheter intervention. . Findings were as follows: As detailed in the accompanying catheterization report. Suffice it to say the patient did well postprocedure without chest pain or other complaints. On the morning of 5/23, felt the patient was stable and ready for discharge. Labs from today were reviewed and are stable. Discharge Exam: Letitia Zaidi      Patient Instructions:      Medication List        CONTINUE taking these medications      amLODIPine 10 MG tablet  Commonly known as: NORVASC     aspirin 81 MG EC tablet     lisinopril 10 MG tablet  Commonly known as: PRINIVIL;ZESTRIL     metFORMIN 1000 MG tablet  Commonly known as: GLUCOPHAGE     metoprolol succinate 50 MG extended release tablet  Commonly known as: TOPROL XL     nitroGLYCERIN 0.4 MG SL tablet  Commonly known as: Nitrostat  Place 1 tablet under the tongue every 5 minutes as needed for Chest pain up to max of 3 total doses. If no relief after 1 dose, call 911.      rosuvastatin 20 MG tablet  Commonly known as: CRESTOR     ticagrelor 90 MG Tabs tablet  Commonly known as: BRILINTA     Trijardy XR 25-5-1000 MG Tb24  Generic drug: Empagliflozin-Linaglip-Metform              Activity: activity as tolerated  Diet: low fat, low cholesterol diet      Follow-up appt: Dr. Tiffanie Syed     [phone]    in 10 days      Kandy Valverde MD   5/23/2023
Parent(s)

## 2023-05-23 NOTE — PROGRESS NOTES
Bedside and Verbal shift change report given to Peterson Flores RN and Silvia South RN (oncoming nurse) by Yeimi Joshua (offgoing nurse). Report included the following information Nurse Handoff Report and Cardiac Rhythm NSR w/ PVCs . Assisted to bathroom and educated patient on how to use the call light. Snacks were requested. Jell-o and crackers offered. No complaints of pain. 0- Brilinta was given with ice water. 0300 assisted patient to bathroom, then back into bed. Tolerated well.    0600 post cardiac cath EKG completed at this time. Offered personal hygiene assistance, patient declines as he is independent with care and reports that he has his own personal care items. Bedside and Verbal shift change report given to Yeimi Joshua (oncoming nurse) by Peterson Flores RN and Silvia South RN (offgoing nurse). Report included the following information Nurse Handoff Report and Cardiac Rhythm NSR w/ PVCs .

## 2023-05-23 NOTE — CARE COORDINATION
05/23/23 1128   IMM Letter   Observation Status Letter date given: 05/23/23   Observation Status Letter time given: 1128   Observation Status Letter given to Patient/Family/Significant other/Guardian/POA/by: CISCO BellamyN RN  Care Management

## 2023-05-23 NOTE — CARE COORDINATION
Met with pt to complete initial assessment and observation status. He stated he is looking for his license. He stated it was not returned to him at registration before he was brought to the floor. Informed pt's nurse Krista Lujan.             CISCO EppsN RN  Care Management

## 2023-05-23 NOTE — PROGRESS NOTES
conducted an initial consultation and Spiritual Assessment for Cherelle Glass, who is a 65 y.o.,male. Patients Primary Language is: [place]. According to the patients EMR Synagogue Affiliation is: Unknown. The reason the Patient came to the hospital is:   Patient Active Problem List    Diagnosis Date Noted    CAD (coronary artery disease) 05/22/2023    ACS (acute coronary syndrome) ([facility] 05/13/2020    Elevated prostate specific antigen (PSA) 01/16/2013        The  provided the following Interventions:  Initiated a relationship of care and support. With patient in room 99 [id number] today as a new admit to the hospital . Patient may be scheduled for heart surgery today. There is no advance directive on file but patient may consider doing one later. Offered prayer and support to the patient. Provided chaplaincy education. Provided information about Spiritual Care Services. The following outcomes were achieved:  Patient shared limited information about his. medical narrative and spiritual journey/beliefs. Patient processed feeling about current hospitalization. Patient expressed gratitude for pastoral care visit. Assessment:  Patient does not have any Christian/cultural needs that will affect patients preferences in health care. There are no further spiritual or Christian issues which require Spiritual Care Services interventions at this time. Plan:  Chaplains will continue to follow and will provide pastoral care on an as needed/requested basis    . [facility]   [phone]

## 2023-05-23 NOTE — PROGRESS NOTES
Patient looking for his 's license. Called, Registration, not in there. Security was called, not in security but they will be on the look. Will inform patient. Discharge instruction given. Patient verbalized understanding    1400> Patient still waiting for transport.

## 2023-06-01 RX ORDER — TICAGRELOR 90 MG/1
TABLET ORAL
Qty: 60 TABLET | Refills: 11 | Status: SHIPPED | OUTPATIENT
Start: 2023-06-01

## 2023-08-29 ENCOUNTER — OFFICE VISIT (OUTPATIENT)
Age: 63
End: 2023-08-29
Payer: COMMERCIAL

## 2023-08-29 VITALS
BODY MASS INDEX: 29.96 KG/M2 | OXYGEN SATURATION: 98 % | HEIGHT: 71 IN | SYSTOLIC BLOOD PRESSURE: 118 MMHG | WEIGHT: 214 LBS | HEART RATE: 75 BPM | DIASTOLIC BLOOD PRESSURE: 72 MMHG

## 2023-08-29 DIAGNOSIS — R07.9 CHEST PAIN, UNSPECIFIED TYPE: Primary | ICD-10-CM

## 2023-08-29 PROCEDURE — 93000 ELECTROCARDIOGRAM COMPLETE: CPT | Performed by: INTERNAL MEDICINE

## 2023-08-29 PROCEDURE — 99214 OFFICE O/P EST MOD 30 MIN: CPT | Performed by: INTERNAL MEDICINE

## 2023-08-29 ASSESSMENT — PATIENT HEALTH QUESTIONNAIRE - PHQ9
SUM OF ALL RESPONSES TO PHQ QUESTIONS 1-9: 0
SUM OF ALL RESPONSES TO PHQ9 QUESTIONS 1 & 2: 0
SUM OF ALL RESPONSES TO PHQ QUESTIONS 1-9: 0
2. FEELING DOWN, DEPRESSED OR HOPELESS: 0
SUM OF ALL RESPONSES TO PHQ QUESTIONS 1-9: 0
1. LITTLE INTEREST OR PLEASURE IN DOING THINGS: 0
SUM OF ALL RESPONSES TO PHQ QUESTIONS 1-9: 0

## 2023-08-29 NOTE — PROGRESS NOTES
HISTORY OF PRESENT ILLNESS  Kaylie Acevedo  59 y.o. male     Chief Complaint   Patient presents with    Follow-up     1 month       ASSESSMENT and PLAN    The encounter diagnosis was Chest pain, unspecified type. Mr. Kaylie Acevedo has diagnosis of CAD. He has history of non-insulin-dependent diabetes mellitus, hypertension, hyperlipidemia, family history of early CAD, tobacco use for over 40 years. In May 2020, he developed unstable angina and ACS but initially did not seek medical attention. Later in May 2020, he had coronary angiography with accelerating angina. His super dominant RCA had serial 80-90% occlusion. He had circumflex which was small to moderate in size with proximal 90%. He had distal LAD 70-80%; however, his LAD was quite small. Because of the small caliber of LAD and circumflex, he had percutaneous intervention of his super dominant RCA and subsequently has staged LAD and circumflex revascularization. He tolerated procedure well. He had a friend of his move in to the neighborhood; she is a good cook and he has put on some weight. He has stayed off of tobacco since 2020. He was lost to follow-up between December 2020 and May 2023 when he developed recurrent exertional chest pains. In May 2023, he underwent repeat coronary angiography for recurrent angina. His dominant RCA previously stented segments are widely patent. LAD has new ostial/proximal 95% stenosis as well as mid 75% focal ISR. Both of these lesions were stented to residual 0% using DIANE. He retired from work at [employer] around June 2023. He still drinks about suspected. CAD:    He has exertional chest tightness has resolved completely. BP:    Well controlled at 118/72. Rhythm:    Stable sinus at 75 bpm.  CHF:    There is no evidence of decompensated CHF noted. Weight:     His weight today is 214 pounds. His baseline weight is 220 pounds. Cholesterol:   Target LDL <70.  Crestor
Melisa Davison presents today for   Chief Complaint   Patient presents with    Follow-up     1 month       Melisaarmand Davison preferred language for health care discussion is english/other. Is someone accompanying this pt? no    Is the patient using any DME equipment during OV? no    Depression Screening:  Depression: Not at risk    PHQ-2 Score: 0        Learning Assessment:  Who is the primary learner? Patient    What is the preferred language for health care of the primary learner? ENGLISH    How does the primary learner prefer to learn new concepts? DEMONSTRATION    Answered By patient    Relationship to Learner SELF           Pt currently taking Anticoagulant therapy? no    Pt currently taking Antiplatelet therapy ? Aspirin  Brilinta      Coordination of Care:  1. Have you been to the ER, urgent care clinic since your last visit? Hospitalized since your last visit? no    2. Have you seen or consulted any other health care providers outside of the [address] [place] [address] since your last visit? Include any pap smears or colon screening.  no
normal (ped)...

## 2024-01-02 RX ORDER — ACETAMINOPHEN/DIPHENHYDRAMINE 500MG-25MG
81 TABLET ORAL DAILY
Qty: 90 TABLET | Refills: 3 | Status: SHIPPED | OUTPATIENT
Start: 2024-01-02

## 2024-03-01 ENCOUNTER — OFFICE VISIT (OUTPATIENT)
Age: 64
End: 2024-03-01
Payer: COMMERCIAL

## 2024-03-01 VITALS
BODY MASS INDEX: 29.54 KG/M2 | HEART RATE: 95 BPM | HEIGHT: 71 IN | OXYGEN SATURATION: 99 % | WEIGHT: 211 LBS | SYSTOLIC BLOOD PRESSURE: 120 MMHG | DIASTOLIC BLOOD PRESSURE: 76 MMHG

## 2024-03-01 DIAGNOSIS — R07.9 CHEST PAIN, UNSPECIFIED TYPE: ICD-10-CM

## 2024-03-01 DIAGNOSIS — R09.89 BRUIT: Primary | ICD-10-CM

## 2024-03-01 PROCEDURE — 93000 ELECTROCARDIOGRAM COMPLETE: CPT | Performed by: INTERNAL MEDICINE

## 2024-03-01 PROCEDURE — 99214 OFFICE O/P EST MOD 30 MIN: CPT | Performed by: INTERNAL MEDICINE

## 2024-03-01 ASSESSMENT — PATIENT HEALTH QUESTIONNAIRE - PHQ9
1. LITTLE INTEREST OR PLEASURE IN DOING THINGS: 0
SUM OF ALL RESPONSES TO PHQ QUESTIONS 1-9: 0
SUM OF ALL RESPONSES TO PHQ QUESTIONS 1-9: 0
2. FEELING DOWN, DEPRESSED OR HOPELESS: 0
SUM OF ALL RESPONSES TO PHQ9 QUESTIONS 1 & 2: 0
SUM OF ALL RESPONSES TO PHQ QUESTIONS 1-9: 0
SUM OF ALL RESPONSES TO PHQ QUESTIONS 1-9: 0

## 2024-03-01 NOTE — PROGRESS NOTES
Tye Pickens presents today for   Chief Complaint   Patient presents with    Follow-up     6 months       Tye Pickens preferred language for health care discussion is english/other.    Is someone accompanying this pt? no    Is the patient using any DME equipment during OV? no    Depression Screening:  Depression: Not at risk (3/1/2024)    PHQ-2     PHQ-2 Score: 0        Learning Assessment:  Who is the primary learner? Patient    What is the preferred language for health care of the primary learner? ENGLISH    How does the primary learner prefer to learn new concepts? DEMONSTRATION    Answered By patient    Relationship to Learner SELF           Pt currently taking Anticoagulant therapy? no    Pt currently taking Antiplatelet therapy ? Aspirin  brilinta      Coordination of Care:  1. Have you been to the ER, urgent care clinic since your last visit? Hospitalized since your last visit? no    2. Have you seen or consulted any other health care providers outside of the [facility] since your last visit? Include any pap smears or colon screening. no    
cath performed by Yoseph Haney MD at [facility] CARDIAC CATH LAB    CARDIAC PROCEDURE N/A 5/22/2023    Percutaneous coronary intervention performed by Yoseph Haney MD at [facility] CARDIAC CATH LAB    WRIST FRACTURE SURGERY  2002    Dr. Juarez       Current Outpatient Medications   Medication Sig Dispense Refill    RA ASPIRIN EC 81 MG EC tablet take 1 tablet by mouth once daily 90 tablet 3    BRILINTA 90 MG TABS tablet take 1 tablet by mouth twice a day 60 tablet 11    TRIJARDY XR 25-5-1000 MG TB24 Take 1 tablet by mouth daily      amLODIPine (NORVASC) 10 MG tablet Take 1 tablet by mouth daily      nitroGLYCERIN (NITROSTAT) 0.4 MG SL tablet Place 1 tablet under the tongue every 5 minutes as needed for Chest pain up to max of 3 total doses. If no relief after 1 dose, call 911. 25 tablet 0    lisinopril (PRINIVIL;ZESTRIL) 10 MG tablet Take by mouth daily      metFORMIN (GLUCOPHAGE) 1000 MG tablet Take 0.5 tablets by mouth daily      metoprolol succinate (TOPROL XL) 50 MG extended release tablet take 1 tablet by mouth once daily      rosuvastatin (CRESTOR) 20 MG tablet Take 1 tablet by mouth daily       No current facility-administered medications for this visit.       The patient has a family history of    Social History     Tobacco Use    Smoking status: Former     Current packs/day: 0.80     Types: Cigarettes    Smokeless tobacco: Never   Substance Use Topics    Alcohol use: Yes     Alcohol/week: 11.7 standard drinks of alcohol    Drug use: No       Lab Results   Component Value Date/Time    CHOL 90 05/15/2020 05:32 AM    HDL 31 05/15/2020 05:32 AM        BP Readings from Last 3 Encounters:   03/01/24 120/76   08/29/23 118/72   05/23/23 129/80        Pulse Readings from Last 3 Encounters:   03/01/24 95   08/29/23 75   05/23/23 76       Wt Readings from Last 3 Encounters:   03/01/24 95.7 kg (211 lb)   08/29/23 97.1 kg (214 lb)   05/22/23 98.4 kg (217 lb)         EKG: normal sinus rhythm, nonspecific ST and T waves changes, Q waves

## 2024-03-01 NOTE — PATIENT INSTRUCTIONS
If you have not heard from the central scheduler to schedule your testing in 48 hours, please call [phone].

## 2024-03-19 ENCOUNTER — HOSPITAL ENCOUNTER (OUTPATIENT)
Facility: HOSPITAL | Age: 64
Discharge: HOME | End: 2024-03-21
Attending: INTERNAL MEDICINE
Payer: COMMERCIAL

## 2024-03-19 DIAGNOSIS — R09.89 BRUIT: ICD-10-CM

## 2024-03-19 LAB
VAS LEFT CCA DIST EDV: 8.4 CM/S
VAS LEFT CCA DIST PSV: 63.3 CM/S
VAS LEFT CCA MID EDV: 19.71 CM/S
VAS LEFT CCA MID PSV: 77.85 CM/S
VAS LEFT CCA PROX EDV: 18.1 CM/S
VAS LEFT CCA PROX PSV: 77.9 CM/S
VAS LEFT ECA EDV: 11.63 CM/S
VAS LEFT ECA PSV: 79.5 CM/S
VAS LEFT ICA DIST EDV: 26.2 CM/S
VAS LEFT ICA DIST PSV: 85.9 CM/S
VAS LEFT ICA MID EDV: 24.8 CM/S
VAS LEFT ICA MID PSV: 84.1 CM/S
VAS LEFT ICA PROX EDV: 19.7 CM/S
VAS LEFT ICA PROX PSV: 68.2 CM/S
VAS LEFT ICA/CCA PSV: 1.36 NO UNITS
VAS LEFT SUBCLAVIAN PROX EDV: 0 CM/S
VAS LEFT SUBCLAVIAN PROX PSV: 106.4 CM/S
VAS LEFT VERTEBRAL EDV: 7.97 CM/S
VAS LEFT VERTEBRAL PSV: 36.4 CM/S
VAS RIGHT CCA DIST EDV: 17.6 CM/S
VAS RIGHT CCA DIST PSV: 80.6 CM/S
VAS RIGHT CCA MID EDV: 20.84 CM/S
VAS RIGHT CCA MID PSV: 85.45 CM/S
VAS RIGHT CCA PROX EDV: 19.2 CM/S
VAS RIGHT CCA PROX PSV: 101.6 CM/S
VAS RIGHT ECA EDV: 11.15 CM/S
VAS RIGHT ECA PSV: 93.5 CM/S
VAS RIGHT ICA DIST EDV: 19.7 CM/S
VAS RIGHT ICA DIST PSV: 71.4 CM/S
VAS RIGHT ICA MID EDV: 18.1 CM/S
VAS RIGHT ICA MID PSV: 66.6 CM/S
VAS RIGHT ICA PROX EDV: 22.5 CM/S
VAS RIGHT ICA PROX PSV: 96.8 CM/S
VAS RIGHT ICA/CCA PSV: 1.2 NO UNITS
VAS RIGHT SUBCLAVIAN PROX EDV: 0 CM/S
VAS RIGHT SUBCLAVIAN PROX PSV: 81.1 CM/S
VAS RIGHT VERTEBRAL EDV: 17.08 CM/S
VAS RIGHT VERTEBRAL PSV: 57.2 CM/S

## 2024-03-19 PROCEDURE — 93880 EXTRACRANIAL BILAT STUDY: CPT

## 2024-03-19 PROCEDURE — 93880 EXTRACRANIAL BILAT STUDY: CPT | Performed by: INTERNAL MEDICINE

## 2024-04-17 ENCOUNTER — TELEPHONE (OUTPATIENT)
Age: 64
End: 2024-04-17

## 2024-04-17 NOTE — TELEPHONE ENCOUNTER
[facility] fax request cardiac clearance for colonoscopy      Verbal order and read back per Yoseph Haney MD  Low risk for procedure can hold brilinta after June 2024  Faxed form back to provider

## 2024-05-14 ENCOUNTER — TELEPHONE (OUTPATIENT)
Age: 64
End: 2024-05-14

## 2024-05-14 NOTE — TELEPHONE ENCOUNTER
Called patient with results from Vascular duplex carotid bilateral.  Patient verbalized understanding.

## 2024-05-14 NOTE — TELEPHONE ENCOUNTER
----- Message from Lillie Haro MA sent at 5/14/2024  9:19 AM EDT -----    ----- Message -----  From: Yoseph Haney MD  Sent: 3/21/2024   8:31 AM EDT  To: Nargis Valdes RN    No significant bilateral ICA disease.  ----- Message -----  From: Nargis Valdes RN  Sent: 3/20/2024   6:39 AM EDT  To: Yoseph Haney MD    Ordered due to hx of CAD

## 2024-06-27 RX ORDER — TICAGRELOR 90 MG/1
TABLET ORAL
Qty: 60 TABLET | Refills: 11 | Status: SHIPPED | OUTPATIENT
Start: 2024-06-27

## 2024-09-10 ENCOUNTER — OFFICE VISIT (OUTPATIENT)
Age: 64
End: 2024-09-10
Payer: COMMERCIAL

## 2024-09-10 VITALS
SYSTOLIC BLOOD PRESSURE: 120 MMHG | HEART RATE: 98 BPM | BODY MASS INDEX: 31.64 KG/M2 | WEIGHT: 226 LBS | OXYGEN SATURATION: 97 % | HEIGHT: 71 IN | DIASTOLIC BLOOD PRESSURE: 74 MMHG

## 2024-09-10 DIAGNOSIS — R07.9 CHEST PAIN, UNSPECIFIED TYPE: Primary | ICD-10-CM

## 2024-09-10 PROBLEM — R04.2 HEMOPTYSIS: Status: ACTIVE | Noted: 2024-09-10

## 2024-09-10 PROBLEM — E78.00 HYPERCHOLESTEROLEMIA: Status: ACTIVE | Noted: 2023-02-22

## 2024-09-10 PROBLEM — E11.9 DIABETES MELLITUS (HCC): Status: ACTIVE | Noted: 2023-02-22

## 2024-09-10 PROBLEM — I10 HYPERTENSIVE DISORDER: Status: ACTIVE | Noted: 2023-02-22

## 2024-09-10 PROBLEM — Z77.090 ASBESTOS EXPOSURE: Status: ACTIVE | Noted: 2024-09-10

## 2024-09-10 PROBLEM — J43.2 CENTRILOBULAR EMPHYSEMA (HCC): Status: ACTIVE | Noted: 2024-09-10

## 2024-09-10 PROBLEM — Z87.898 HISTORY OF HEMOPTYSIS: Status: ACTIVE | Noted: 2024-09-10

## 2024-09-10 PROCEDURE — 3078F DIAST BP <80 MM HG: CPT | Performed by: INTERNAL MEDICINE

## 2024-09-10 PROCEDURE — 3074F SYST BP LT 130 MM HG: CPT | Performed by: INTERNAL MEDICINE

## 2024-09-10 PROCEDURE — 99214 OFFICE O/P EST MOD 30 MIN: CPT | Performed by: INTERNAL MEDICINE

## 2024-09-10 PROCEDURE — 93000 ELECTROCARDIOGRAM COMPLETE: CPT | Performed by: INTERNAL MEDICINE

## 2024-09-10 ASSESSMENT — PATIENT HEALTH QUESTIONNAIRE - PHQ9
2. FEELING DOWN, DEPRESSED OR HOPELESS: NOT AT ALL
SUM OF ALL RESPONSES TO PHQ9 QUESTIONS 1 & 2: 0
SUM OF ALL RESPONSES TO PHQ QUESTIONS 1-9: 0
1. LITTLE INTEREST OR PLEASURE IN DOING THINGS: NOT AT ALL
SUM OF ALL RESPONSES TO PHQ QUESTIONS 1-9: 0

## 2025-04-29 ENCOUNTER — OFFICE VISIT (OUTPATIENT)
Age: 65
End: 2025-04-29
Payer: COMMERCIAL

## 2025-04-29 VITALS
WEIGHT: 230 LBS | DIASTOLIC BLOOD PRESSURE: 82 MMHG | BODY MASS INDEX: 32.2 KG/M2 | OXYGEN SATURATION: 99 % | HEIGHT: 71 IN | SYSTOLIC BLOOD PRESSURE: 126 MMHG | HEART RATE: 82 BPM

## 2025-04-29 DIAGNOSIS — R07.9 CHEST PAIN, UNSPECIFIED TYPE: Primary | ICD-10-CM

## 2025-04-29 DIAGNOSIS — I25.10 CORONARY ARTERY DISEASE, UNSPECIFIED VESSEL OR LESION TYPE, UNSPECIFIED WHETHER ANGINA PRESENT, UNSPECIFIED WHETHER NATIVE OR TRANSPLANTED HEART: ICD-10-CM

## 2025-04-29 PROCEDURE — 3079F DIAST BP 80-89 MM HG: CPT | Performed by: INTERNAL MEDICINE

## 2025-04-29 PROCEDURE — 93000 ELECTROCARDIOGRAM COMPLETE: CPT | Performed by: INTERNAL MEDICINE

## 2025-04-29 PROCEDURE — 99214 OFFICE O/P EST MOD 30 MIN: CPT | Performed by: INTERNAL MEDICINE

## 2025-04-29 PROCEDURE — 3074F SYST BP LT 130 MM HG: CPT | Performed by: INTERNAL MEDICINE

## 2025-04-29 RX ORDER — RIVAROXABAN 2.5 MG/1
2.5 TABLET, FILM COATED ORAL
COMMUNITY
Start: 2025-03-02

## 2025-04-29 ASSESSMENT — PATIENT HEALTH QUESTIONNAIRE - PHQ9
SUM OF ALL RESPONSES TO PHQ QUESTIONS 1-9: 0
1. LITTLE INTEREST OR PLEASURE IN DOING THINGS: NOT AT ALL
2. FEELING DOWN, DEPRESSED OR HOPELESS: NOT AT ALL
SUM OF ALL RESPONSES TO PHQ QUESTIONS 1-9: 0

## 2025-04-29 NOTE — PROGRESS NOTES
HISTORY OF PRESENT ILLNESS  Tye Pickens  64 y.o. male     Chief Complaint   Patient presents with    Follow-up       ASSESSMENT and PLAN    The primary encounter diagnosis was CAD (coronary artery disease). A diagnosis of Chest pain, unspecified type was also pertinent to this visit.    . Tye Pickens has diagnosis of CAD.  He has history of non-insulin-dependent diabetes mellitus, hypertension, hyperlipidemia, family history of early CAD, tobacco use for over 40 years.      In May 2020, he developed unstable angina and ACS but initially did not seek medical attention.  Later in May 2020, he had coronary angiography with accelerating angina.  His super dominant RCA had serial 80-90% occlusion.  He had circumflex which was small to moderate in size with proximal 90%.  He had distal LAD 70-80%; however, his LAD was quite small.  Because of the small caliber of LAD and circumflex, he had percutaneous intervention of his super dominant RCA and subsequently has staged LAD and circumflex revascularization.  He tolerated procedure well.  He had a friend of his move in to the neighborhood; she is a good cook and he has put on some weight.  He has stayed off of tobacco since 2020.  He was lost to follow-up between December 2020 and May 2023 when he developed recurrent exertional chest pains.  In May 2023, he underwent repeat coronary angiography for recurrent angina.  His dominant RCA previously stented segments are widely patent.  LAD has new ostial/proximal 95% stenosis as well as mid 75% focal ISR.  Both of these lesions were stented to residual 0% using DIANE.  He retired from work at [employer] around June 2023.  He still drinks beer.  His carotid duplex scan in March 2024 showed less than 50% bilateral ICA disease.    Medication regimen reviewed and current cardiac regimen will be continued.  All new testing since the last office visit was independently reviewed by me.    CAD:    Clinically

## 2025-04-29 NOTE — PROGRESS NOTES
Tye Pickens presents today for   Chief Complaint   Patient presents with    Follow-up       Tye Pickens preferred language for health care discussion is english/other.    Is someone accompanying this pt? no    Is the patient using any DME equipment during OV? no    Depression Screening:  Depression: Not at risk (4/29/2025)    PHQ-2     PHQ-2 Score: 0        Learning Assessment:  Who is the primary learner? Patient    What is the preferred language for health care of the primary learner? ENGLISH    How does the primary learner prefer to learn new concepts? DEMONSTRATION    Answered By patient    Relationship to Learner SELF           Pt currently taking Anticoagulant therapy? no    Pt currently taking Antiplatelet therapy ? xarelto aspirin      Coordination of Care:  1. Have you been to the ER, urgent care clinic since your last visit? Hospitalized since your last visit? no    2. Have you seen or consulted any other health care providers outside of the [facility] since your last visit? Include any pap smears or colon screening. no

## (undated) DEVICE — GLIDESHEATH SLENDER STAINLESS STEEL KIT: Brand: GLIDESHEATH SLENDER

## (undated) DEVICE — COPILOT BLEEDBACK CONTROL VALVE: Brand: COPILOT

## (undated) DEVICE — DEVICE INFL W ACCS + HEMSTAS VLV INSRT TOOL AND TORQ BASIX

## (undated) DEVICE — PRESSURE MONITORING SET: Brand: TRUWAVE

## (undated) DEVICE — CATHETER DIAG AD L100CM DIA6FR STD JUDKINS L 4 POLYUR COR

## (undated) DEVICE — XIENCE SIERRA™ EVEROLIMUS ELUTING CORONARY STENT SYSTEM 2.25 MM X 23 MM / RAPID-EXCHANGE
Type: IMPLANTABLE DEVICE | Status: NON-FUNCTIONAL
Brand: XIENCE SIERRA™

## (undated) DEVICE — CATHETER DIAG AD 6FR L100CM 0.038IN COR POLYUR AMPLATZ 2

## (undated) DEVICE — PACK PROCEDURE SURG VASC CATH 161 MMC LF

## (undated) DEVICE — TREK CORONARY DILATATION CATHETER 2.25 MM X 12 MM / RAPID-EXCHANGE: Brand: TREK

## (undated) DEVICE — CATHETER GUID EXTRA BACKUP 3.5 0.070IN 6FR 100CM VISTA BRITE TIP

## (undated) DEVICE — GUIDEWIRE VASC L260CM DIA0.035IN RAD 3MM J TIP L7CM PTFE

## (undated) DEVICE — RADIFOCUS OPTITORQUE ANGIOGRAPHIC CATHETER: Brand: OPTITORQUE

## (undated) DEVICE — SET FLD ADMIN 3 W STPCOCK FIX FEM L BOR 1IN

## (undated) DEVICE — CATHETER GUID 6FR L100CM GRN PTFE XBLAD3.5 TRUELUMEN HYBRID

## (undated) DEVICE — HI-TORQUE BALANCE GUIDE WIRE W/HYDROCOAT .014 STRAIGHT TIP 3.0 CM X 190 CM: Brand: HI-TORQUE BALANCE

## (undated) DEVICE — PROCEDURE KIT FLUID MGMT 10 FR CUST MAINFOLD

## (undated) DEVICE — CATHETER GUID 6FR L100CM GRN PTFE JR4 TRUELUMEN HYBRID

## (undated) DEVICE — TREK CORONARY DILATATION CATHETER 2.50 MM X 12 MM / RAPID-EXCHANGE: Brand: TREK

## (undated) DEVICE — GUIDEWIRE VASC L260CM DIA0035IN TIP L3MM PTFE J STD TAPR FIX

## (undated) DEVICE — COVER US PRB W15XL120CM W/ GEL RUBBERBAND TAPE STRP FLD GEN

## (undated) DEVICE — CATH GUID 6F .070 XB 4 100CM -- VISTA BRITE TIP - ORDER AS EA

## (undated) DEVICE — CATH BLLN ANGIO 2.25X15MM SC EUPHORA RX

## (undated) DEVICE — ANGIOGRAPHY KIT CUST VASC

## (undated) DEVICE — CATH ANGI BLLN DIL 4.0X12MM -- NC EUPHORA

## (undated) DEVICE — BAND HEMOSTAT DRY D-STAT RAD --

## (undated) DEVICE — CATHETER ANGIO L100CM OD6FR AD JUDKINS L 5 COR VASC W/O